# Patient Record
Sex: FEMALE | Race: WHITE | NOT HISPANIC OR LATINO | Employment: OTHER | URBAN - METROPOLITAN AREA
[De-identification: names, ages, dates, MRNs, and addresses within clinical notes are randomized per-mention and may not be internally consistent; named-entity substitution may affect disease eponyms.]

---

## 2017-01-01 ENCOUNTER — APPOINTMENT (OUTPATIENT)
Dept: GENERAL RADIOLOGY | Facility: HOSPITAL | Age: 77
End: 2017-01-01

## 2017-01-01 ENCOUNTER — HOSPITAL ENCOUNTER (INPATIENT)
Facility: HOSPITAL | Age: 77
LOS: 4 days | End: 2017-01-25
Attending: EMERGENCY MEDICINE | Admitting: INTERNAL MEDICINE

## 2017-01-01 ENCOUNTER — APPOINTMENT (OUTPATIENT)
Dept: CT IMAGING | Facility: HOSPITAL | Age: 77
End: 2017-01-01

## 2017-01-01 ENCOUNTER — OFFICE VISIT (OUTPATIENT)
Dept: FAMILY MEDICINE CLINIC | Facility: CLINIC | Age: 77
End: 2017-01-01

## 2017-01-01 VITALS
TEMPERATURE: 98.2 F | BODY MASS INDEX: 17.54 KG/M2 | SYSTOLIC BLOOD PRESSURE: 122 MMHG | OXYGEN SATURATION: 96 % | WEIGHT: 118.4 LBS | DIASTOLIC BLOOD PRESSURE: 80 MMHG | HEART RATE: 72 BPM | HEIGHT: 69 IN

## 2017-01-01 VITALS
TEMPERATURE: 100.5 F | RESPIRATION RATE: 16 BRPM | BODY MASS INDEX: 22.2 KG/M2 | OXYGEN SATURATION: 71 % | SYSTOLIC BLOOD PRESSURE: 112 MMHG | WEIGHT: 130 LBS | DIASTOLIC BLOOD PRESSURE: 59 MMHG | HEART RATE: 91 BPM | HEIGHT: 64 IN

## 2017-01-01 DIAGNOSIS — A41.9 SEPSIS, DUE TO UNSPECIFIED ORGANISM: ICD-10-CM

## 2017-01-01 DIAGNOSIS — J18.9 MULTIFOCAL PNEUMONIA: ICD-10-CM

## 2017-01-01 DIAGNOSIS — J96.01 ACUTE RESPIRATORY FAILURE WITH HYPOXIA AND HYPERCAPNIA (HCC): Primary | ICD-10-CM

## 2017-01-01 DIAGNOSIS — J96.02 ACUTE RESPIRATORY FAILURE WITH HYPOXIA AND HYPERCAPNIA (HCC): Primary | ICD-10-CM

## 2017-01-01 DIAGNOSIS — J43.8 OTHER EMPHYSEMA (HCC): ICD-10-CM

## 2017-01-01 DIAGNOSIS — I48.91 ATRIAL FIBRILLATION WITH RAPID VENTRICULAR RESPONSE (HCC): ICD-10-CM

## 2017-01-01 DIAGNOSIS — J40 BRONCHITIS: Primary | ICD-10-CM

## 2017-01-01 LAB
ALBUMIN SERPL-MCNC: 4.4 G/DL (ref 3.5–5.2)
ALBUMIN/GLOB SERPL: 1.7 G/DL
ALP SERPL-CCNC: 76 U/L (ref 39–117)
ALT SERPL W P-5'-P-CCNC: 16 U/L (ref 1–33)
ANION GAP SERPL CALCULATED.3IONS-SCNC: 15.8 MMOL/L
ARTERIAL PATENCY WRIST A: ABNORMAL
ARTERIAL PATENCY WRIST A: ABNORMAL
ARTERIAL PATENCY WRIST A: POSITIVE
AST SERPL-CCNC: 19 U/L (ref 1–32)
ATMOSPHERIC PRESS: 736.7 MMHG
ATMOSPHERIC PRESS: 746.1 MMHG
ATMOSPHERIC PRESS: 746.7 MMHG
BASE EXCESS BLDA CALC-SCNC: -1.9 MMOL/L (ref 0–2)
BASE EXCESS BLDA CALC-SCNC: -4.1 MMOL/L (ref 0–2)
BASE EXCESS BLDA CALC-SCNC: 2.9 MMOL/L (ref 0–2)
BASOPHILS # BLD AUTO: 0.05 10*3/MM3 (ref 0–0.2)
BASOPHILS NFR BLD AUTO: 0.3 % (ref 0–1.5)
BDY SITE: ABNORMAL
BILIRUB SERPL-MCNC: 1 MG/DL (ref 0.1–1.2)
BUN BLD-MCNC: 17 MG/DL (ref 8–23)
BUN/CREAT SERPL: 19.5 (ref 7–25)
CALCIUM SPEC-SCNC: 9.4 MG/DL (ref 8.6–10.5)
CHLORIDE SERPL-SCNC: 99 MMOL/L (ref 98–107)
CO2 SERPL-SCNC: 28.2 MMOL/L (ref 22–29)
CREAT BLD-MCNC: 0.87 MG/DL (ref 0.57–1)
D-LACTATE SERPL-SCNC: 3.8 MMOL/L (ref 0.5–2)
D-LACTATE SERPL-SCNC: 3.9 MMOL/L (ref 0.5–2)
DEPRECATED RDW RBC AUTO: 62.3 FL (ref 37–54)
DIGOXIN SERPL-MCNC: 0.6 NG/ML (ref 0.6–1.2)
EOSINOPHIL # BLD AUTO: 0.1 10*3/MM3 (ref 0–0.7)
EOSINOPHIL NFR BLD AUTO: 0.6 % (ref 0.3–6.2)
ERYTHROCYTE [DISTWIDTH] IN BLOOD BY AUTOMATED COUNT: 17.7 % (ref 11.7–13)
GAS FLOW AIRWAY: 8 LPM
GFR SERPL CREATININE-BSD FRML MDRD: 63 ML/MIN/1.73
GLOBULIN UR ELPH-MCNC: 2.6 GM/DL
GLUCOSE BLD-MCNC: 206 MG/DL (ref 65–99)
HCO3 BLDA-SCNC: 28.9 MMOL/L (ref 22–28)
HCO3 BLDA-SCNC: 29.5 MMOL/L (ref 22–28)
HCO3 BLDA-SCNC: 29.9 MMOL/L (ref 22–28)
HCT VFR BLD AUTO: 40.5 % (ref 35.6–45.5)
HGB BLD-MCNC: 11.5 G/DL (ref 11.9–15.5)
HOLD SPECIMEN: NORMAL
HOLD SPECIMEN: NORMAL
HOROWITZ INDEX BLD+IHG-RTO: 40 %
HOROWITZ INDEX BLD+IHG-RTO: 50 %
IMM GRANULOCYTES # BLD: 0.16 10*3/MM3 (ref 0–0.03)
IMM GRANULOCYTES NFR BLD: 1 % (ref 0–0.5)
INR PPP: 1.78 (ref 0.9–1.1)
LYMPHOCYTES # BLD AUTO: 1.34 10*3/MM3 (ref 0.9–4.8)
LYMPHOCYTES NFR BLD AUTO: 8.5 % (ref 19.6–45.3)
MCH RBC QN AUTO: 27.4 PG (ref 26.9–32)
MCHC RBC AUTO-ENTMCNC: 28.4 G/DL (ref 32.4–36.3)
MCV RBC AUTO: 96.4 FL (ref 80.5–98.2)
MODALITY: ABNORMAL
MONOCYTES # BLD AUTO: 0.48 10*3/MM3 (ref 0.2–1.2)
MONOCYTES NFR BLD AUTO: 3 % (ref 5–12)
NEUTROPHILS # BLD AUTO: 13.67 10*3/MM3 (ref 1.9–8.1)
NEUTROPHILS NFR BLD AUTO: 86.6 % (ref 42.7–76)
NT-PROBNP SERPL-MCNC: 1568 PG/ML (ref 0–1800)
O2 A-A PPRESDIFF RESPIRATORY: 0.4 MMHG
O2 A-A PPRESDIFF RESPIRATORY: 0.5 MMHG
PCO2 BLDA: 106.5 MM HG (ref 35–45)
PCO2 BLDA: 49.8 MM HG (ref 35–45)
PCO2 BLDA: 90.6 MM HG (ref 35–45)
PEEP RESPIRATORY: 8 CM[H2O]
PH BLDA: 7.05 PH UNITS (ref 7.35–7.45)
PH BLDA: 7.13 PH UNITS (ref 7.35–7.45)
PH BLDA: 7.37 PH UNITS (ref 7.35–7.45)
PLATELET # BLD AUTO: 208 10*3/MM3 (ref 140–500)
PMV BLD AUTO: 10.7 FL (ref 6–12)
PO2 BLDA: 100.6 MM HG (ref 80–100)
PO2 BLDA: 120.9 MM HG (ref 80–100)
PO2 BLDA: 93.4 MM HG (ref 80–100)
POTASSIUM BLD-SCNC: 4.1 MMOL/L (ref 3.5–5.2)
PROCALCITONIN SERPL-MCNC: 0.27 NG/ML (ref 0.1–0.25)
PROT SERPL-MCNC: 7 G/DL (ref 6–8.5)
PROTHROMBIN TIME: 20.1 SECONDS (ref 11.7–14.2)
RBC # BLD AUTO: 4.2 10*6/MM3 (ref 3.9–5.2)
SAO2 % BLDCOA: 93.1 % (ref 92–99)
SAO2 % BLDCOA: 96.7 % (ref 92–99)
SAO2 % BLDCOA: 96.9 % (ref 92–99)
SET MECH RESP RATE: 26
SET MECH RESP RATE: 26
SODIUM BLD-SCNC: 143 MMOL/L (ref 136–145)
TOTAL RATE: 26 BREATHS/MINUTE
TOTAL RATE: 26 BREATHS/MINUTE
TOTAL RATE: 29 BREATHS/MINUTE
TROPONIN T SERPL-MCNC: 0.09 NG/ML (ref 0–0.03)
VT ON VENT VENT: 494 ML
VT ON VENT VENT: 595 ML
WBC NRBC COR # BLD: 15.8 10*3/MM3 (ref 4.5–10.7)
WHOLE BLOOD HOLD SPECIMEN: NORMAL
WHOLE BLOOD HOLD SPECIMEN: NORMAL

## 2017-01-01 PROCEDURE — 87040 BLOOD CULTURE FOR BACTERIA: CPT | Performed by: EMERGENCY MEDICINE

## 2017-01-01 PROCEDURE — 25010000002 LORAZEPAM PER 2 MG: Performed by: INTERNAL MEDICINE

## 2017-01-01 PROCEDURE — 84484 ASSAY OF TROPONIN QUANT: CPT | Performed by: EMERGENCY MEDICINE

## 2017-01-01 PROCEDURE — 80162 ASSAY OF DIGOXIN TOTAL: CPT | Performed by: EMERGENCY MEDICINE

## 2017-01-01 PROCEDURE — 94660 CPAP INITIATION&MGMT: CPT

## 2017-01-01 PROCEDURE — 83605 ASSAY OF LACTIC ACID: CPT | Performed by: EMERGENCY MEDICINE

## 2017-01-01 PROCEDURE — 25010000002 METHYLPREDNISOLONE PER 125 MG: Performed by: EMERGENCY MEDICINE

## 2017-01-01 PROCEDURE — 82803 BLOOD GASES ANY COMBINATION: CPT

## 2017-01-01 PROCEDURE — 71010 HC CHEST PA OR AP: CPT

## 2017-01-01 PROCEDURE — 94640 AIRWAY INHALATION TREATMENT: CPT

## 2017-01-01 PROCEDURE — 94799 UNLISTED PULMONARY SVC/PX: CPT

## 2017-01-01 PROCEDURE — 94760 N-INVAS EAR/PLS OXIMETRY 1: CPT

## 2017-01-01 PROCEDURE — 25010000002 PIPERACILLIN SOD-TAZOBACTAM PER 1 G: Performed by: INTERNAL MEDICINE

## 2017-01-01 PROCEDURE — 85610 PROTHROMBIN TIME: CPT | Performed by: EMERGENCY MEDICINE

## 2017-01-01 PROCEDURE — 93010 ELECTROCARDIOGRAM REPORT: CPT | Performed by: INTERNAL MEDICINE

## 2017-01-01 PROCEDURE — 71250 CT THORAX DX C-: CPT

## 2017-01-01 PROCEDURE — 85025 COMPLETE CBC W/AUTO DIFF WBC: CPT | Performed by: EMERGENCY MEDICINE

## 2017-01-01 PROCEDURE — 25010000002 HYDROMORPHONE PER 4 MG: Performed by: INTERNAL MEDICINE

## 2017-01-01 PROCEDURE — 25010000002 MORPHINE PER 10 MG: Performed by: EMERGENCY MEDICINE

## 2017-01-01 PROCEDURE — 25010000002 ONDANSETRON PER 1 MG: Performed by: EMERGENCY MEDICINE

## 2017-01-01 PROCEDURE — 25010000002 VANCOMYCIN: Performed by: INTERNAL MEDICINE

## 2017-01-01 PROCEDURE — 25010000002 METHYLPREDNISOLONE PER 40 MG: Performed by: INTERNAL MEDICINE

## 2017-01-01 PROCEDURE — 25010000002 MAGNESIUM SULFATE IN D5W 1G/100ML (PREMIX) 10-5 MG/ML-% SOLUTION: Performed by: INTERNAL MEDICINE

## 2017-01-01 PROCEDURE — 36600 WITHDRAWAL OF ARTERIAL BLOOD: CPT

## 2017-01-01 PROCEDURE — 25010000002 DIGOXIN PER 500 MCG: Performed by: INTERNAL MEDICINE

## 2017-01-01 PROCEDURE — 99284 EMERGENCY DEPT VISIT MOD MDM: CPT

## 2017-01-01 PROCEDURE — 25010000002 MORPHINE PER 10 MG: Performed by: INTERNAL MEDICINE

## 2017-01-01 PROCEDURE — 36415 COLL VENOUS BLD VENIPUNCTURE: CPT | Performed by: EMERGENCY MEDICINE

## 2017-01-01 PROCEDURE — 84145 PROCALCITONIN (PCT): CPT | Performed by: EMERGENCY MEDICINE

## 2017-01-01 PROCEDURE — 93005 ELECTROCARDIOGRAM TRACING: CPT | Performed by: EMERGENCY MEDICINE

## 2017-01-01 PROCEDURE — 83880 ASSAY OF NATRIURETIC PEPTIDE: CPT | Performed by: EMERGENCY MEDICINE

## 2017-01-01 PROCEDURE — 25010000003 CEFTRIAXONE PER 250 MG: Performed by: EMERGENCY MEDICINE

## 2017-01-01 PROCEDURE — 80053 COMPREHEN METABOLIC PANEL: CPT | Performed by: EMERGENCY MEDICINE

## 2017-01-01 PROCEDURE — 99213 OFFICE O/P EST LOW 20 MIN: CPT | Performed by: FAMILY MEDICINE

## 2017-01-01 PROCEDURE — 99221 1ST HOSP IP/OBS SF/LOW 40: CPT | Performed by: INTERNAL MEDICINE

## 2017-01-01 RX ORDER — MORPHINE SULFATE 2 MG/ML
1 INJECTION, SOLUTION INTRAMUSCULAR; INTRAVENOUS EVERY 4 HOURS PRN
Status: DISCONTINUED | OUTPATIENT
Start: 2017-01-01 | End: 2017-01-01

## 2017-01-01 RX ORDER — VANCOMYCIN HYDROCHLORIDE 1 G/200ML
1000 INJECTION, SOLUTION INTRAVENOUS EVERY 24 HOURS
Status: DISCONTINUED | OUTPATIENT
Start: 2017-01-01 | End: 2017-01-01

## 2017-01-01 RX ORDER — PROMETHAZINE HYDROCHLORIDE 25 MG/ML
6.25 INJECTION, SOLUTION INTRAMUSCULAR; INTRAVENOUS EVERY 4 HOURS PRN
Status: DISCONTINUED | OUTPATIENT
Start: 2017-01-01 | End: 2017-01-01 | Stop reason: HOSPADM

## 2017-01-01 RX ORDER — LORAZEPAM 2 MG/ML
1 CONCENTRATE ORAL
Status: DISCONTINUED | OUTPATIENT
Start: 2017-01-01 | End: 2017-01-01 | Stop reason: HOSPADM

## 2017-01-01 RX ORDER — ONDANSETRON 2 MG/ML
4 INJECTION INTRAMUSCULAR; INTRAVENOUS ONCE
Status: COMPLETED | OUTPATIENT
Start: 2017-01-01 | End: 2017-01-01

## 2017-01-01 RX ORDER — LORAZEPAM 2 MG/ML
2 INJECTION INTRAMUSCULAR
Status: DISCONTINUED | OUTPATIENT
Start: 2017-01-01 | End: 2017-01-01 | Stop reason: HOSPADM

## 2017-01-01 RX ORDER — ATROPINE SULFATE 10 MG/ML
2 SOLUTION/ DROPS OPHTHALMIC 2 TIMES DAILY PRN
Status: DISCONTINUED | OUTPATIENT
Start: 2017-01-01 | End: 2017-01-01 | Stop reason: HOSPADM

## 2017-01-01 RX ORDER — LORAZEPAM 2 MG/ML
0.5 INJECTION INTRAMUSCULAR
Status: DISCONTINUED | OUTPATIENT
Start: 2017-01-01 | End: 2017-01-01 | Stop reason: HOSPADM

## 2017-01-01 RX ORDER — HALOPERIDOL 2 MG/ML
1 SOLUTION ORAL EVERY 4 HOURS PRN
Status: DISCONTINUED | OUTPATIENT
Start: 2017-01-01 | End: 2017-01-01 | Stop reason: HOSPADM

## 2017-01-01 RX ORDER — ALBUTEROL SULFATE 2.5 MG/3ML
2.5 SOLUTION RESPIRATORY (INHALATION)
Status: COMPLETED | OUTPATIENT
Start: 2017-01-01 | End: 2017-01-01

## 2017-01-01 RX ORDER — LORAZEPAM 2 MG/ML
1 INJECTION INTRAMUSCULAR
Status: DISCONTINUED | OUTPATIENT
Start: 2017-01-01 | End: 2017-01-01 | Stop reason: HOSPADM

## 2017-01-01 RX ORDER — PRAVASTATIN SODIUM 10 MG
TABLET ORAL
Qty: 30 TABLET | Refills: 3 | Status: SHIPPED | OUTPATIENT
Start: 2017-01-01

## 2017-01-01 RX ORDER — ACETAMINOPHEN 325 MG/1
650 TABLET ORAL EVERY 4 HOURS PRN
Status: DISCONTINUED | OUTPATIENT
Start: 2017-01-01 | End: 2017-01-01 | Stop reason: HOSPADM

## 2017-01-01 RX ORDER — ACETAMINOPHEN 650 MG/1
650 SUPPOSITORY RECTAL EVERY 4 HOURS PRN
Status: DISCONTINUED | OUTPATIENT
Start: 2017-01-01 | End: 2017-01-01 | Stop reason: HOSPADM

## 2017-01-01 RX ORDER — CEFTRIAXONE SODIUM 1 G/50ML
1 INJECTION, SOLUTION INTRAVENOUS ONCE
Status: COMPLETED | OUTPATIENT
Start: 2017-01-01 | End: 2017-01-01

## 2017-01-01 RX ORDER — OXYCODONE HYDROCHLORIDE AND ACETAMINOPHEN 5; 325 MG/1; MG/1
1 TABLET ORAL EVERY 4 HOURS PRN
COMMUNITY

## 2017-01-01 RX ORDER — DIGOXIN 0.25 MG/ML
125 INJECTION INTRAMUSCULAR; INTRAVENOUS ONCE
Status: COMPLETED | OUTPATIENT
Start: 2017-01-01 | End: 2017-01-01

## 2017-01-01 RX ORDER — DIPHENOXYLATE HYDROCHLORIDE AND ATROPINE SULFATE 2.5; .025 MG/1; MG/1
1 TABLET ORAL
Status: DISCONTINUED | OUTPATIENT
Start: 2017-01-01 | End: 2017-01-01 | Stop reason: HOSPADM

## 2017-01-01 RX ORDER — LORAZEPAM 2 MG/ML
0.5 CONCENTRATE ORAL
Status: DISCONTINUED | OUTPATIENT
Start: 2017-01-01 | End: 2017-01-01 | Stop reason: HOSPADM

## 2017-01-01 RX ORDER — ACETAMINOPHEN 160 MG/5ML
650 SOLUTION ORAL EVERY 4 HOURS PRN
Status: DISCONTINUED | OUTPATIENT
Start: 2017-01-01 | End: 2017-01-01 | Stop reason: HOSPADM

## 2017-01-01 RX ORDER — GLYCOPYRROLATE 0.2 MG/ML
0.4 INJECTION INTRAMUSCULAR; INTRAVENOUS
Status: DISCONTINUED | OUTPATIENT
Start: 2017-01-01 | End: 2017-01-01 | Stop reason: HOSPADM

## 2017-01-01 RX ORDER — SODIUM CHLORIDE 0.9 % (FLUSH) 0.9 %
1-10 SYRINGE (ML) INJECTION AS NEEDED
Status: DISCONTINUED | OUTPATIENT
Start: 2017-01-01 | End: 2017-01-01

## 2017-01-01 RX ORDER — PROMETHAZINE HYDROCHLORIDE 6.25 MG/5ML
6.25 SYRUP ORAL EVERY 4 HOURS PRN
Status: DISCONTINUED | OUTPATIENT
Start: 2017-01-01 | End: 2017-01-01 | Stop reason: HOSPADM

## 2017-01-01 RX ORDER — LORAZEPAM 2 MG/ML
2 CONCENTRATE ORAL
Status: DISCONTINUED | OUTPATIENT
Start: 2017-01-01 | End: 2017-01-01 | Stop reason: HOSPADM

## 2017-01-01 RX ORDER — PROMETHAZINE HYDROCHLORIDE 12.5 MG/1
6.25 SUPPOSITORY RECTAL EVERY 4 HOURS PRN
Status: DISCONTINUED | OUTPATIENT
Start: 2017-01-01 | End: 2017-01-01 | Stop reason: HOSPADM

## 2017-01-01 RX ORDER — AZITHROMYCIN 250 MG/1
TABLET, FILM COATED ORAL
Qty: 6 TABLET | Refills: 0 | Status: SHIPPED | OUTPATIENT
Start: 2017-01-01

## 2017-01-01 RX ORDER — IPRATROPIUM BROMIDE AND ALBUTEROL SULFATE 2.5; .5 MG/3ML; MG/3ML
3 SOLUTION RESPIRATORY (INHALATION)
Status: DISCONTINUED | OUTPATIENT
Start: 2017-01-01 | End: 2017-01-01

## 2017-01-01 RX ORDER — LORAZEPAM 0.5 MG/1
0.5 TABLET ORAL
Status: DISCONTINUED | OUTPATIENT
Start: 2017-01-01 | End: 2017-01-01 | Stop reason: HOSPADM

## 2017-01-01 RX ORDER — DILTIAZEM HYDROCHLORIDE 5 MG/ML
5 INJECTION INTRAVENOUS ONCE
Status: COMPLETED | OUTPATIENT
Start: 2017-01-01 | End: 2017-01-01

## 2017-01-01 RX ORDER — SCOLOPAMINE TRANSDERMAL SYSTEM 1 MG/1
1 PATCH, EXTENDED RELEASE TRANSDERMAL
Status: DISCONTINUED | OUTPATIENT
Start: 2017-01-01 | End: 2017-01-01 | Stop reason: HOSPADM

## 2017-01-01 RX ORDER — NALOXONE HCL 0.4 MG/ML
0.4 VIAL (ML) INJECTION
Status: DISCONTINUED | OUTPATIENT
Start: 2017-01-01 | End: 2017-01-01

## 2017-01-01 RX ORDER — ACETAMINOPHEN 500 MG
1000 TABLET ORAL ONCE
Status: DISCONTINUED | OUTPATIENT
Start: 2017-01-01 | End: 2017-01-01

## 2017-01-01 RX ORDER — LORAZEPAM 1 MG/1
2 TABLET ORAL
Status: DISCONTINUED | OUTPATIENT
Start: 2017-01-01 | End: 2017-01-01 | Stop reason: HOSPADM

## 2017-01-01 RX ORDER — SODIUM CHLORIDE 0.9 % (FLUSH) 0.9 %
10 SYRINGE (ML) INJECTION AS NEEDED
Status: DISCONTINUED | OUTPATIENT
Start: 2017-01-01 | End: 2017-01-01 | Stop reason: HOSPADM

## 2017-01-01 RX ORDER — ACETAMINOPHEN 160 MG/5ML
1000 SOLUTION ORAL ONCE
Status: COMPLETED | OUTPATIENT
Start: 2017-01-01 | End: 2017-01-01

## 2017-01-01 RX ORDER — PREDNISONE 10 MG/1
TABLET ORAL
Qty: 30 TABLET | Refills: 2 | Status: SHIPPED | OUTPATIENT
Start: 2017-01-01

## 2017-01-01 RX ORDER — HALOPERIDOL 5 MG/ML
1 INJECTION INTRAMUSCULAR EVERY 4 HOURS PRN
Status: DISCONTINUED | OUTPATIENT
Start: 2017-01-01 | End: 2017-01-01 | Stop reason: HOSPADM

## 2017-01-01 RX ORDER — GLYCOPYRROLATE 0.2 MG/ML
0.2 INJECTION INTRAMUSCULAR; INTRAVENOUS
Status: DISCONTINUED | OUTPATIENT
Start: 2017-01-01 | End: 2017-01-01 | Stop reason: HOSPADM

## 2017-01-01 RX ORDER — IPRATROPIUM BROMIDE AND ALBUTEROL SULFATE 2.5; .5 MG/3ML; MG/3ML
3 SOLUTION RESPIRATORY (INHALATION) EVERY 4 HOURS PRN
Status: DISCONTINUED | OUTPATIENT
Start: 2017-01-01 | End: 2017-01-01 | Stop reason: HOSPADM

## 2017-01-01 RX ORDER — OXYCODONE AND ACETAMINOPHEN 10; 325 MG/1; MG/1
1 TABLET ORAL NIGHTLY
COMMUNITY

## 2017-01-01 RX ORDER — METHYLPREDNISOLONE SODIUM SUCCINATE 125 MG/2ML
125 INJECTION, POWDER, LYOPHILIZED, FOR SOLUTION INTRAMUSCULAR; INTRAVENOUS ONCE
Status: COMPLETED | OUTPATIENT
Start: 2017-01-01 | End: 2017-01-01

## 2017-01-01 RX ORDER — NITROGLYCERIN 0.4 MG/1
0.4 TABLET SUBLINGUAL
Status: DISCONTINUED | OUTPATIENT
Start: 2017-01-01 | End: 2017-01-01

## 2017-01-01 RX ORDER — HALOPERIDOL 1 MG/1
1 TABLET ORAL EVERY 4 HOURS PRN
Status: DISCONTINUED | OUTPATIENT
Start: 2017-01-01 | End: 2017-01-01 | Stop reason: HOSPADM

## 2017-01-01 RX ORDER — PROMETHAZINE HYDROCHLORIDE 25 MG/1
6.25 TABLET ORAL EVERY 4 HOURS PRN
Status: DISCONTINUED | OUTPATIENT
Start: 2017-01-01 | End: 2017-01-01 | Stop reason: HOSPADM

## 2017-01-01 RX ORDER — METHYLPREDNISOLONE SODIUM SUCCINATE 40 MG/ML
40 INJECTION, POWDER, LYOPHILIZED, FOR SOLUTION INTRAMUSCULAR; INTRAVENOUS EVERY 12 HOURS
Status: DISCONTINUED | OUTPATIENT
Start: 2017-01-01 | End: 2017-01-01

## 2017-01-01 RX ORDER — LORAZEPAM 1 MG/1
1 TABLET ORAL
Status: DISCONTINUED | OUTPATIENT
Start: 2017-01-01 | End: 2017-01-01 | Stop reason: HOSPADM

## 2017-01-01 RX ORDER — SODIUM CHLORIDE, SODIUM LACTATE, POTASSIUM CHLORIDE, CALCIUM CHLORIDE 600; 310; 30; 20 MG/100ML; MG/100ML; MG/100ML; MG/100ML
75 INJECTION, SOLUTION INTRAVENOUS CONTINUOUS
Status: DISCONTINUED | OUTPATIENT
Start: 2017-01-01 | End: 2017-01-01

## 2017-01-01 RX ADMIN — HYDROMORPHONE HYDROCHLORIDE 1 MG: 1 INJECTION, SOLUTION INTRAMUSCULAR; INTRAVENOUS; SUBCUTANEOUS at 08:37

## 2017-01-01 RX ADMIN — METHYLPREDNISOLONE SODIUM SUCCINATE 40 MG: 40 INJECTION, POWDER, FOR SOLUTION INTRAMUSCULAR; INTRAVENOUS at 20:09

## 2017-01-01 RX ADMIN — MORPHINE SULFATE 1 MG: 2 INJECTION, SOLUTION INTRAMUSCULAR; INTRAVENOUS at 23:54

## 2017-01-01 RX ADMIN — DILTIAZEM HYDROCHLORIDE 7.5 MG/HR: 100 INJECTION, POWDER, LYOPHILIZED, FOR SOLUTION INTRAVENOUS at 08:00

## 2017-01-01 RX ADMIN — GLYCOPYRROLATE 0.4 MG: 0.2 INJECTION, SOLUTION INTRAMUSCULAR; INTRAVENOUS at 16:32

## 2017-01-01 RX ADMIN — MAGNESIUM SULFATE HEPTAHYDRATE 1 G: 1 INJECTION, SOLUTION INTRAVENOUS at 23:50

## 2017-01-01 RX ADMIN — HYDROMORPHONE HYDROCHLORIDE 1 MG: 1 INJECTION, SOLUTION INTRAMUSCULAR; INTRAVENOUS; SUBCUTANEOUS at 12:40

## 2017-01-01 RX ADMIN — IPRATROPIUM BROMIDE AND ALBUTEROL SULFATE 3 ML: .5; 3 SOLUTION RESPIRATORY (INHALATION) at 07:39

## 2017-01-01 RX ADMIN — HYDROMORPHONE HYDROCHLORIDE 0.5 MG: 1 INJECTION, SOLUTION INTRAMUSCULAR; INTRAVENOUS; SUBCUTANEOUS at 06:07

## 2017-01-01 RX ADMIN — LORAZEPAM 0.5 MG: 0.5 TABLET ORAL at 14:39

## 2017-01-01 RX ADMIN — LORAZEPAM 1 MG: 2 INJECTION INTRAMUSCULAR; INTRAVENOUS at 04:31

## 2017-01-01 RX ADMIN — VANCOMYCIN HYDROCHLORIDE 1250 MG: 1 INJECTION, POWDER, LYOPHILIZED, FOR SOLUTION INTRAVENOUS at 21:39

## 2017-01-01 RX ADMIN — GLYCOPYRROLATE 0.4 MG: 0.2 INJECTION, SOLUTION INTRAMUSCULAR; INTRAVENOUS at 08:37

## 2017-01-01 RX ADMIN — METHYLPREDNISOLONE SODIUM SUCCINATE 40 MG: 40 INJECTION, POWDER, FOR SOLUTION INTRAMUSCULAR; INTRAVENOUS at 09:25

## 2017-01-01 RX ADMIN — HYDROMORPHONE HYDROCHLORIDE 1 MG: 1 INJECTION, SOLUTION INTRAMUSCULAR; INTRAVENOUS; SUBCUTANEOUS at 00:36

## 2017-01-01 RX ADMIN — LORAZEPAM 1 MG: 2 INJECTION INTRAMUSCULAR; INTRAVENOUS at 21:11

## 2017-01-01 RX ADMIN — HYDROMORPHONE HYDROCHLORIDE 0.5 MG: 1 INJECTION, SOLUTION INTRAMUSCULAR; INTRAVENOUS; SUBCUTANEOUS at 08:08

## 2017-01-01 RX ADMIN — HYDROMORPHONE HYDROCHLORIDE 1 MG: 1 INJECTION, SOLUTION INTRAMUSCULAR; INTRAVENOUS; SUBCUTANEOUS at 12:33

## 2017-01-01 RX ADMIN — LORAZEPAM 1 MG: 2 INJECTION INTRAMUSCULAR; INTRAVENOUS at 00:43

## 2017-01-01 RX ADMIN — LORAZEPAM 1 MG: 2 INJECTION INTRAMUSCULAR; INTRAVENOUS at 16:32

## 2017-01-01 RX ADMIN — SCOPALAMINE 1 PATCH: 1 PATCH, EXTENDED RELEASE TRANSDERMAL at 15:03

## 2017-01-01 RX ADMIN — LORAZEPAM 1 MG: 2 INJECTION INTRAMUSCULAR; INTRAVENOUS at 12:33

## 2017-01-01 RX ADMIN — LORAZEPAM 1 MG: 2 INJECTION INTRAMUSCULAR; INTRAVENOUS at 09:02

## 2017-01-01 RX ADMIN — GLYCOPYRROLATE 0.4 MG: 0.2 INJECTION, SOLUTION INTRAMUSCULAR; INTRAVENOUS at 12:40

## 2017-01-01 RX ADMIN — AZITHROMYCIN DIHYDRATE 500 MG: 500 INJECTION, POWDER, LYOPHILIZED, FOR SOLUTION INTRAVENOUS at 16:33

## 2017-01-01 RX ADMIN — HYDROMORPHONE HYDROCHLORIDE 1 MG: 1 INJECTION, SOLUTION INTRAMUSCULAR; INTRAVENOUS; SUBCUTANEOUS at 21:09

## 2017-01-01 RX ADMIN — HYDROMORPHONE HYDROCHLORIDE 0.5 MG: 1 INJECTION, SOLUTION INTRAMUSCULAR; INTRAVENOUS; SUBCUTANEOUS at 22:57

## 2017-01-01 RX ADMIN — LORAZEPAM 2 MG: 2 SOLUTION, CONCENTRATE ORAL at 19:37

## 2017-01-01 RX ADMIN — HYDROMORPHONE HYDROCHLORIDE 1 MG: 1 INJECTION, SOLUTION INTRAMUSCULAR; INTRAVENOUS; SUBCUTANEOUS at 16:32

## 2017-01-01 RX ADMIN — MORPHINE SULFATE 1 MG: 2 INJECTION, SOLUTION INTRAMUSCULAR; INTRAVENOUS at 09:35

## 2017-01-01 RX ADMIN — HYDROMORPHONE HYDROCHLORIDE 0.5 MG: 1 INJECTION, SOLUTION INTRAMUSCULAR; INTRAVENOUS; SUBCUTANEOUS at 03:53

## 2017-01-01 RX ADMIN — GLYCOPYRROLATE 0.4 MG: 0.2 INJECTION, SOLUTION INTRAMUSCULAR; INTRAVENOUS at 04:46

## 2017-01-01 RX ADMIN — HYDROMORPHONE HYDROCHLORIDE 1 MG: 1 INJECTION, SOLUTION INTRAMUSCULAR; INTRAVENOUS; SUBCUTANEOUS at 04:46

## 2017-01-01 RX ADMIN — HYDROMORPHONE HYDROCHLORIDE 1 MG: 1 INJECTION, SOLUTION INTRAMUSCULAR; INTRAVENOUS; SUBCUTANEOUS at 21:11

## 2017-01-01 RX ADMIN — LORAZEPAM 1 MG: 2 INJECTION INTRAMUSCULAR; INTRAVENOUS at 21:09

## 2017-01-01 RX ADMIN — SODIUM CHLORIDE, POTASSIUM CHLORIDE, SODIUM LACTATE AND CALCIUM CHLORIDE 75 ML/HR: 600; 310; 30; 20 INJECTION, SOLUTION INTRAVENOUS at 21:04

## 2017-01-01 RX ADMIN — HYDROMORPHONE HYDROCHLORIDE 1 MG: 1 INJECTION, SOLUTION INTRAMUSCULAR; INTRAVENOUS; SUBCUTANEOUS at 03:48

## 2017-01-01 RX ADMIN — HYDROMORPHONE HYDROCHLORIDE 0.5 MG: 1 INJECTION, SOLUTION INTRAMUSCULAR; INTRAVENOUS; SUBCUTANEOUS at 10:14

## 2017-01-01 RX ADMIN — DIGOXIN 125 MCG: 0.25 INJECTION INTRAMUSCULAR; INTRAVENOUS at 20:09

## 2017-01-01 RX ADMIN — MORPHINE SULFATE 4 MG: 4 INJECTION, SOLUTION INTRAMUSCULAR; INTRAVENOUS at 13:36

## 2017-01-01 RX ADMIN — GLYCOPYRROLATE 0.4 MG: 0.2 INJECTION, SOLUTION INTRAMUSCULAR; INTRAVENOUS at 05:50

## 2017-01-01 RX ADMIN — LORAZEPAM 1 MG: 2 INJECTION INTRAMUSCULAR; INTRAVENOUS at 14:33

## 2017-01-01 RX ADMIN — IPRATROPIUM BROMIDE AND ALBUTEROL SULFATE 3 ML: .5; 3 SOLUTION RESPIRATORY (INHALATION) at 19:43

## 2017-01-01 RX ADMIN — LORAZEPAM 0.5 MG: 2 INJECTION INTRAMUSCULAR; INTRAVENOUS at 03:49

## 2017-01-01 RX ADMIN — HYDROMORPHONE HYDROCHLORIDE 1 MG: 1 INJECTION, SOLUTION INTRAMUSCULAR; INTRAVENOUS; SUBCUTANEOUS at 15:06

## 2017-01-01 RX ADMIN — METHYLPREDNISOLONE SODIUM SUCCINATE 125 MG: 125 INJECTION, POWDER, FOR SOLUTION INTRAMUSCULAR; INTRAVENOUS at 13:35

## 2017-01-01 RX ADMIN — ACETAMINOPHEN 1000 MG: 160 SOLUTION ORAL at 13:52

## 2017-01-01 RX ADMIN — GLYCOPYRROLATE 0.4 MG: 0.2 INJECTION, SOLUTION INTRAMUSCULAR; INTRAVENOUS at 15:00

## 2017-01-01 RX ADMIN — DILTIAZEM HYDROCHLORIDE 5 MG/HR: 100 INJECTION, POWDER, LYOPHILIZED, FOR SOLUTION INTRAVENOUS at 15:28

## 2017-01-01 RX ADMIN — GLYCOPYRROLATE 0.4 MG: 0.2 INJECTION, SOLUTION INTRAMUSCULAR; INTRAVENOUS at 00:36

## 2017-01-01 RX ADMIN — HYDROMORPHONE HYDROCHLORIDE 0.5 MG: 1 INJECTION, SOLUTION INTRAMUSCULAR; INTRAVENOUS; SUBCUTANEOUS at 20:08

## 2017-01-01 RX ADMIN — LORAZEPAM 0.5 MG: 2 INJECTION INTRAMUSCULAR; INTRAVENOUS at 13:56

## 2017-01-01 RX ADMIN — HYDROMORPHONE HYDROCHLORIDE 1 MG: 1 INJECTION, SOLUTION INTRAMUSCULAR; INTRAVENOUS; SUBCUTANEOUS at 19:10

## 2017-01-01 RX ADMIN — HYDROMORPHONE HYDROCHLORIDE 0.5 MG: 1 INJECTION, SOLUTION INTRAMUSCULAR; INTRAVENOUS; SUBCUTANEOUS at 11:56

## 2017-01-01 RX ADMIN — LORAZEPAM 1 MG: 2 INJECTION INTRAMUSCULAR; INTRAVENOUS at 00:36

## 2017-01-01 RX ADMIN — HYDROMORPHONE HYDROCHLORIDE 1 MG: 1 INJECTION, SOLUTION INTRAMUSCULAR; INTRAVENOUS; SUBCUTANEOUS at 09:03

## 2017-01-01 RX ADMIN — HYDROMORPHONE HYDROCHLORIDE 1 MG: 1 INJECTION, SOLUTION INTRAMUSCULAR; INTRAVENOUS; SUBCUTANEOUS at 13:37

## 2017-01-01 RX ADMIN — GLYCOPYRROLATE 0.4 MG: 0.2 INJECTION, SOLUTION INTRAMUSCULAR; INTRAVENOUS at 12:33

## 2017-01-01 RX ADMIN — LORAZEPAM 1 MG: 2 INJECTION INTRAMUSCULAR; INTRAVENOUS at 04:46

## 2017-01-01 RX ADMIN — ALBUTEROL SULFATE 2.5 MG: 2.5 SOLUTION RESPIRATORY (INHALATION) at 15:20

## 2017-01-01 RX ADMIN — TAZOBACTAM SODIUM AND PIPERACILLIN SODIUM 3.38 G: 375; 3 INJECTION, SOLUTION INTRAVENOUS at 04:43

## 2017-01-01 RX ADMIN — TAZOBACTAM SODIUM AND PIPERACILLIN SODIUM 3.38 G: 375; 3 INJECTION, SOLUTION INTRAVENOUS at 11:56

## 2017-01-01 RX ADMIN — HYDROMORPHONE HYDROCHLORIDE 2 MG: 1 INJECTION, SOLUTION INTRAMUSCULAR; INTRAVENOUS; SUBCUTANEOUS at 15:00

## 2017-01-01 RX ADMIN — ONDANSETRON 4 MG: 2 INJECTION INTRAMUSCULAR; INTRAVENOUS at 13:35

## 2017-01-01 RX ADMIN — TAZOBACTAM SODIUM AND PIPERACILLIN SODIUM 3.38 G: 375; 3 INJECTION, SOLUTION INTRAVENOUS at 20:09

## 2017-01-01 RX ADMIN — ALBUTEROL SULFATE 2.5 MG: 2.5 SOLUTION RESPIRATORY (INHALATION) at 15:15

## 2017-01-01 RX ADMIN — GLYCOPYRROLATE 0.4 MG: 0.2 INJECTION, SOLUTION INTRAMUSCULAR; INTRAVENOUS at 00:42

## 2017-01-01 RX ADMIN — HYDROMORPHONE HYDROCHLORIDE 1 MG: 1 INJECTION, SOLUTION INTRAMUSCULAR; INTRAVENOUS; SUBCUTANEOUS at 00:43

## 2017-01-01 RX ADMIN — DILTIAZEM HYDROCHLORIDE 5 MG: 5 INJECTION INTRAVENOUS at 20:08

## 2017-01-01 RX ADMIN — MORPHINE SULFATE 1 MG: 2 INJECTION, SOLUTION INTRAMUSCULAR; INTRAVENOUS at 04:45

## 2017-01-01 RX ADMIN — GLYCOPYRROLATE 0.4 MG: 0.2 INJECTION, SOLUTION INTRAMUSCULAR; INTRAVENOUS at 09:02

## 2017-01-01 RX ADMIN — MAGNESIUM SULFATE HEPTAHYDRATE 1 G: 1 INJECTION, SOLUTION INTRAVENOUS at 22:54

## 2017-01-01 RX ADMIN — CEFTRIAXONE SODIUM 1 G: 1 INJECTION, SOLUTION INTRAVENOUS at 16:06

## 2017-01-09 NOTE — PROGRESS NOTES
Subjective   Lorena Yousif is a 76 y.o. female. CC:URI    History of Present Illness   Lorena is a 76 year old female who comes in today for URI symptoms which started 2 days ago.  She quickly goes into pneumonia so needed to be checked soon.  She complains of head and chest congestion.  She has a lot of clear nasal drainage.  Her cough is bad and is productive of greenish sputum.  No known fever, denies chills and sweats.  No more short of breath than usual.  No wheezing.  On continuous oxygen.      The following portions of the patient's history were reviewed and updated as appropriate: allergies, current medications, past medical history, past social history, past surgical history and problem list.    Review of Systems   Constitutional: Positive for fatigue. Negative for chills, diaphoresis and fever.   HENT: Positive for congestion and rhinorrhea. Negative for hearing loss and sore throat.    Eyes: Negative.  Negative for visual disturbance.   Respiratory: Positive for cough and shortness of breath. Negative for wheezing.    Cardiovascular: Negative.  Negative for chest pain, palpitations and leg swelling.   Gastrointestinal: Negative.  Negative for abdominal pain, blood in stool, constipation, diarrhea, nausea and vomiting.   Genitourinary: Negative.  Negative for difficulty urinating and hematuria.   Musculoskeletal: Positive for back pain.   Skin: Negative for rash.        Bruises on arms and legs.  Scabbed abrasions on the left knee   Neurological: Negative.  Negative for dizziness, light-headedness and headaches.   Psychiatric/Behavioral: Negative.  Negative for dysphoric mood and sleep disturbance. The patient is not nervous/anxious.        Objective   Physical Exam   Constitutional: She appears well-developed and well-nourished.   HENT:   Head: Normocephalic and atraumatic.   Right Ear: Hearing, tympanic membrane, external ear and ear canal normal.   Left Ear: Hearing, tympanic membrane, external ear and  ear canal normal.   Nose: Nose normal.   Mouth/Throat: Uvula is midline, oropharynx is clear and moist and mucous membranes are normal. No oropharyngeal exudate.   Cardiovascular: Normal rate, regular rhythm and normal heart sounds.    No murmur heard.  Pulmonary/Chest: Effort normal and breath sounds normal. No respiratory distress. She has no wheezes. She has no rales.   Skin: Skin is warm and dry.   Bruises on her arms and legs.  Also on the left forehead.  Scabs on the left knee   Psychiatric: She has a normal mood and affect. Her behavior is normal.   Nursing note and vitals reviewed.    Vitals:    01/09/17 1316   BP: 122/80   Pulse: 72   Temp: 98.2 °F (36.8 °C)   SpO2: 96%     Assessment/Plan   Problems Addressed this Visit        Respiratory    Chronic obstructive pulmonary disease      Other Visit Diagnoses     Bronchitis    -  Primary    Relevant Medications    azithromycin (ZITHROMAX Z-MAIRA) 250 MG tablet

## 2017-01-09 NOTE — MR AVS SNAPSHOT
Lorena Yousif   1/9/2017 1:00 PM   Office Visit    Dept Phone:  [phone]   Encounter #:  [id number]    Provider:  Lorena Mae MD   Department:  [facility] PRIMARY CARE                Your Full Care Plan              Today's Medication Changes          These changes are accurate as of: 1/9/17  1:50 PM.  If you have any questions, ask your nurse or doctor.               New Medication(s)Ordered:     azithromycin 250 MG tablet   Commonly known as:  ZITHROMAX Z-MAIRA   Take 2 tablets the first day, then 1 tablet daily for 4 days.   Started by:  Lorena Mae MD            Where to Get Your Medications      These medications were sent to [address] [place] [address] AT [facility] - [phone]  - [phone]   [address] [facility] [address]     Phone:  [phone]     azithromycin 250 MG tablet                  Your Updated Medication List          This list is accurate as of: 1/9/17  1:50 PM.  Always use your most recent med list.                albuterol 108 (90 BASE) MCG/ACT inhaler   Commonly known as:  PROVENTIL HFA;VENTOLIN HFA       atenolol 25 MG tablet   Commonly known as:  TENORMIN       azithromycin 250 MG tablet   Commonly known as:  ZITHROMAX Z-MAIRA   Take 2 tablets the first day, then 1 tablet daily for 4 days.       BREO ELLIPTA 100-25 MCG/INH aerosol powder    Generic drug:  Fluticasone Furoate-Vilanterol       citalopram 20 MG tablet   Commonly known as:  CeleXA   TAKE ONE TABLET BY MOUTH DAILY       digoxin 125 MCG tablet   Commonly known as:  LANOXIN       ipratropium-albuterol 0.5-2.5 mg/mL nebulizer   Commonly known as:  DUO-NEB   Take 3 mL by nebulization every 4 (four) hours as needed for wheezing or shortness of air.       Iron tablet       lidocaine 5 %   Commonly known as:  LIDODERM   Place 1 patch on the skin daily. Remove & Discard patch within 12 hours or as directed by MD       nitroglycerin 0.4 MG SL tablet   Commonly known as:  NITROSTAT   Place 1 tablet under the tongue every 5 (five) minutes as needed for chest pain. Take no more than 3 doses in 15 minutes.       oxyCODONE-acetaminophen 5-325 MG/5ML solution   Commonly known as:  ROXICET,PERCOCET       pravastatin 10 MG tablet   Commonly known as:  PRAVACHOL   TAKE ONE TABLET BY MOUTH DAILY       predniSONE 10 MG tablet   Commonly known as:  DELTASONE   TAKE ONE TABLET BY MOUTH DAILY       Umeclidinium Bromide 62.5 MCG/INH aerosol powder        * warfarin 4 MG tablet   Commonly known as:  COUMADIN       * warfarin 2 MG tablet   Commonly known as:  COUMADIN       * Notice:  This list has 2 medication(s) that are the same as other medications prescribed for you. Read the directions carefully, and ask your doctor or other care provider to review them with you.            You Were Diagnosed With        Codes Comments    Bronchitis    -  Primary ICD-10-CM: J40  ICD-9-CM: 490     Other emphysema     ICD-10-CM: J43.8  ICD-9-CM: 492.8       Instructions     None    Patient Instructions History      Upcoming Appointments     Visit Type Date Time Department    OFFICE VISIT 2017  1:00 PM [employer] Signup     [facility] allows you to send messages to your doctor, view your test results, renew your prescriptions, schedule appointments, and more. To sign up, go to [employer] and click on the Sign Up Now link in the New User? box. Enter your [employer] Activation Code exactly as it appears below along with the last four digits of your Social Security Number and your Date of Birth () to complete the sign-up process. If you do not sign up before the expiration date, you must request a new code.    [employer] Activation Code: [email or web address]  Expires: 2017  1:50 PM    If you have questions, you can email [email or web address] or call [phone] to talk to our [employer] staff. Remember,  "MyChart is NOT to be used for urgent needs. For medical emergencies, dial 911.               Other Info from Your Visit           Allergies     No Known Allergies      Reason for Visit     Cough     URI green mucus      Vital Signs     Blood Pressure Pulse Temperature Height Weight Last Menstrual Period    122/80 72 98.2 °F (36.8 °C) 69\" (175.3 cm) 118 lb 6.4 oz (53.7 kg) (LMP Unknown)    Oxygen Saturation Body Mass Index Smoking Status             96% 17.48 kg/m2 Former Smoker         Problems and Diagnoses Noted     Chronic airway obstruction    Degenerative spondylolisthesis    Bronchitis    -  Primary        "

## 2017-01-21 PROBLEM — J96.01 ACUTE RESPIRATORY FAILURE WITH HYPOXIA AND HYPERCAPNIA (HCC): Status: ACTIVE | Noted: 2017-01-01

## 2017-01-21 PROBLEM — J96.02 ACUTE RESPIRATORY FAILURE WITH HYPOXIA AND HYPERCAPNIA (HCC): Status: ACTIVE | Noted: 2017-01-01

## 2017-01-21 NOTE — ED NOTES
Pt hr increased from 116 to 165. md notified. Rt at beside trying to obtain abg. md to place order for cardizem. During this time, pt pulled out IV. Per RT, pt had episode of decreased responsiveness and pt became pale. Pt was then started on cardizem and placed on bipap. Pt color improved and pt more responsive.     Leah Green RN  01/21/17 4508  a     Leah Green RN  01/21/17 0538

## 2017-01-21 NOTE — ED PROVIDER NOTES
EMERGENCY DEPARTMENT ENCOUNTER    CHIEF COMPLAINT  Chief Complaint: shortness of breath  History given by: patient, familty  History limited by: none  Room Number: 25/25  PMD: Lorena Mae MD  Cardiologist: Dr. Mullen  Pulmonologist: Dr. Monteiro    HPI:  Pt is a 76 y.o. female who presents complaining of shortness of breath onset this morning. Family states that this relatively quick onset of SOB is typical of pt's previous pneumonias. Pt also complains of a headache. Pertinent medical hx includes COPD, atrial fibrillation, pneumonia, pacemaker placement.    Duration:  Several hours  Onset: gradual, this morning  Timing: constant  Intensity/Severity: moderate  Progression: unchanged  Associated Symptoms: headache  Aggravating Factors: none stated  Alleviating Factors: none stated  Previous Episodes: hx of COPD, pneumonia  Treatment before arrival: none stated    PAST MEDICAL HISTORY  Active Ambulatory Problems     Diagnosis Date Noted   • Arthritis 02/10/2016   • Chronic obstructive pulmonary disease 02/10/2016   • Cough 02/10/2016   • Cystocele 02/10/2016   • Depression 02/10/2016   • Edema of extremities 02/10/2016   • Fatigue 02/10/2016   • Hyperlipidemia 02/10/2016   • Hypoxia 02/10/2016   • Cerebrovascular accident 02/10/2016   • Varicose veins of lower extremities 02/10/2016   • Weakness 02/10/2016   • Non-traumatic compression fracture of T7 thoracic vertebra 05/25/2016   • Atrial fibrillation with RVR 06/09/2016   • Age-related osteoporosis with current pathological fracture of vertebra 06/15/2016   • H/O mitral valve replacement with mechanical valve, on chronic anticoagulation 06/25/2016   • COPD (chronic obstructive pulmonary disease) 06/25/2016   • Hyperlipidemia 06/25/2016   • Atrial fibrillation 06/25/2016   • Pacemaker 06/25/2016   • Arthritis 06/25/2016   • CAD (coronary artery disease) 06/25/2016   • Vertebral compression fracture, S/P kyphoplasty T6,T7 06/25/2016   • Chronic respiratory  failure with hypoxia 06/26/2016   • Acute respiratory failure with hypoxia 09/06/2016   • Bruising 11/04/2016   • Normocytic anemia 11/23/2016   • Degenerative spondylolisthesis 04/04/2013     Resolved Ambulatory Problems     Diagnosis Date Noted   • Cerumen impaction 02/10/2016   • Chest pain 02/10/2016   • Dehydration 02/10/2016   • Skin lesion of face 02/10/2016   • Pain of hand 02/10/2016   • Mass of breast 02/10/2016   • Pulmonary emphysema 02/10/2016   • Shortness of breath 02/10/2016   • Shoulder pain 02/10/2016   • Upper respiratory tract infection 02/10/2016   • Urinary tract infection 02/10/2016     Past Medical History   Diagnosis Date   • Acute bronchitis    • Anemia, unspecified 11/23/2016   • Coronary artery disease    • Deep vein thrombosis    • Fracture cervical vertebra-closed    • History of transfusion    • Impacted cerumen    • Lumbar pain    • On home oxygen therapy    • Osteopenia    • Osteoporosis    • Pneumonia    • Stroke    • UTI (urinary tract infection)        PAST SURGICAL HISTORY  Past Surgical History   Procedure Laterality Date   • Coronary artery bypass graft     • Hysterectomy     • Pacemaker implantation  1999   • Cardiac valve replacement     • Oophorectomy     • Kyphoplasty N/A 6/24/2016     Procedure: KYPHOPLASTY, T6, T7;  Surgeon: Parish Obando MD;  Location: [facility] 18/19;  Service:        FAMILY HISTORY  Family History   Problem Relation Age of Onset   • Cancer Mother    • Heart disease Sister        SOCIAL HISTORY  Social History     Social History   • Marital status:      Spouse name: N/A   • Number of children: 3   • Years of education: HIGH SCHOOL     Occupational History   • RETIRED      Social History Main Topics   • Smoking status: Former Smoker     Years: 50.00     Types: Cigarettes   • Smokeless tobacco: Never Used      Comment: QUIT 13 YR AGO   • Alcohol use No   • Drug use: No   • Sexual activity: Defer     Other Topics Concern   • Not on file      Social History Narrative   • No narrative on file       ALLERGIES  Review of patient's allergies indicates no known allergies.    REVIEW OF SYSTEMS  Review of Systems   Constitutional: Positive for fever.   HENT: Negative for sore throat.    Eyes: Negative.    Respiratory: Positive for shortness of breath. Negative for cough.    Cardiovascular: Negative for chest pain.   Gastrointestinal: Negative for abdominal pain, diarrhea and vomiting.   Genitourinary: Negative for dysuria.   Musculoskeletal: Negative for neck pain.   Skin: Negative for rash.   Allergic/Immunologic: Negative.    Neurological: Positive for headaches. Negative for weakness and numbness.   Hematological: Negative.    Psychiatric/Behavioral: Negative.    All other systems reviewed and are negative.      PHYSICAL EXAM  ED Triage Vitals   Temp Heart Rate Resp BP SpO2   01/21/17 1250 01/21/17 1250 01/21/17 1258 01/21/17 1309 01/21/17 1250   99.8 °F (37.7 °C) 70 35 146/92 85 %      Temp src Heart Rate Source Patient Position BP Location FiO2 (%)   01/21/17 1250 01/21/17 1250 -- -- --   Tympanic Monitor          Physical Exam   Constitutional: She is oriented to person, place, and time.   HENT:   Head: Normocephalic and atraumatic.   Mouth/Throat: Mucous membranes are dry.   Eyes: EOM are normal. Pupils are equal, round, and reactive to light.   Pale conjunctiva   Neck: Normal range of motion. Neck supple.   Cardiovascular: Normal heart sounds.  An irregularly irregular rhythm present. Tachycardia present.    Pulmonary/Chest: She is in respiratory distress. She has wheezes. She has rhonchi.   Abdominal: Soft. There is no tenderness. There is no rebound and no guarding.   Musculoskeletal: Normal range of motion. She exhibits no edema.   Neurological: She is alert and oriented to person, place, and time. She has normal sensation and normal strength.   Skin: Skin is warm and dry. No rash noted.   Psychiatric: Mood and affect normal.   Nursing note and  vitals reviewed.      LAB RESULTS  Lab Results (last 24 hours)     Procedure Component Value Units Date/Time    Protime-INR [[id number]]  (Abnormal) Collected:  01/21/17 1308    Specimen:  Blood Updated:  01/21/17 1332     Protime 20.1 (H) Seconds      INR 1.78 (H)     Digoxin Level [[id number]]  (Normal) Collected:  01/21/17 1308    Specimen:  Blood Updated:  01/21/17 1346     Digoxin 0.60 ng/mL     CBC & Differential [[id number]] Collected:  01/21/17 1308    Specimen:  Blood Updated:  01/21/17 1329    Narrative:       The following orders were created for panel order CBC & Differential.  Procedure                               Abnormality         Status                     ---------                               -----------         ------                     CBC Auto Differential[[id number]]         Abnormal            Final result                 Please view results for these tests on the individual orders.    Comprehensive Metabolic Panel [[id number]]  (Abnormal) Collected:  01/21/17 1308    Specimen:  Blood Updated:  01/21/17 1342     Glucose 206 (H) mg/dL      BUN 17 mg/dL      Creatinine 0.87 mg/dL      Sodium 143 mmol/L      Potassium 4.1 mmol/L      Chloride 99 mmol/L      CO2 28.2 mmol/L      Calcium 9.4 mg/dL      Total Protein 7.0 g/dL      Albumin 4.40 g/dL      ALT (SGPT) 16 U/L      AST (SGOT) 19 U/L      Alkaline Phosphatase 76 U/L      Total Bilirubin 1.0 mg/dL      eGFR Non African Amer 63 mL/min/1.73      Globulin 2.6 gm/dL      A/G Ratio 1.7 g/dL      BUN/Creatinine Ratio 19.5      Anion Gap 15.8 mmol/L     Narrative:       The MDRD GFR formula is only valid for adults with stable renal function between ages 18 and 70.    BNP [[id number]]  (Normal) Collected:  01/21/17 1308    Specimen:  Blood Updated:  01/21/17 1345     proBNP 1568.0 pg/mL     Narrative:       Among patients with dyspnea, NT-proBNP is highly sensitive for the detection of acute congestive heart failure. In addition NT-proBNP of <300 pg/ml  effectively rules out acute congestive heart failure with 99% negative predictive value.    Troponin [[id number]]  (Abnormal) Collected:  01/21/17 1308    Specimen:  Blood Updated:  01/21/17 1347     Troponin T 0.091 (C) ng/mL     Narrative:       Troponin T Reference Ranges:  Less than 0.03 ng/mL:    Negative for AMI  0.03 to 0.09 ng/mL:      Indeterminant for AMI  Greater than 0.09 ng/mL: Positive for AMI    CBC Auto Differential [[id number]]  (Abnormal) Collected:  01/21/17 1308    Specimen:  Blood Updated:  01/21/17 1329     WBC 15.80 (H) 10*3/mm3      RBC 4.20 10*6/mm3      Hemoglobin 11.5 (L) g/dL      Hematocrit 40.5 %      MCV 96.4 fL      MCH 27.4 pg      MCHC 28.4 (L) g/dL      RDW 17.7 (H) %      RDW-SD 62.3 (H) fl      MPV 10.7 fL      Platelets 208 10*3/mm3      Neutrophil % 86.6 (H) %      Lymphocyte % 8.5 (L) %      Monocyte % 3.0 (L) %      Eosinophil % 0.6 %      Basophil % 0.3 %      Immature Grans % 1.0 (H) %      Neutrophils, Absolute 13.67 (H) 10*3/mm3      Lymphocytes, Absolute 1.34 10*3/mm3      Monocytes, Absolute 0.48 10*3/mm3      Eosinophils, Absolute 0.10 10*3/mm3      Basophils, Absolute 0.05 10*3/mm3      Immature Grans, Absolute 0.16 (H) 10*3/mm3     Procalcitonin [[id number]]  (Abnormal) Collected:  01/21/17 1308    Specimen:  Blood Updated:  01/21/17 1453     Procalcitonin 0.27 (H) ng/mL     Narrative:       As a Marker for Sepsis (Non-Neonates):   1. <0.5 ng/mL represents a low risk of severe sepsis and/or septic shock.  1. >2 ng/mL represents a high risk of severe sepsis and/or septic shock.    As a Marker for Lower Respiratory Tract Infections that require antibiotic therapy:  PCT on Admission     Antibiotic Therapy             6-12 Hrs later  > 0.5                Strongly Recommended            >0.25 - <0.5         Recommended  0.1 - 0.25           Discouraged                   Remeasure/reassess PCT  <0.1                 Strongly Discouraged          Remeasure/reassess PCT      As 28  "day mortality risk marker: \"Change in Procalcitonin Result\" (> 80 % or <=80 %) if Day 0 (or Day 1) and Day 4 values are available. Refer to http://www.[facility]ially[facility]ct[facility]calculator.com/   Change in PCT <=80 %   A decrease of PCT levels below or equal to 80 % defines a positive change in PCT test result representing a higher risk for 28-day all-cause mortality of patients diagnosed with severe sepsis or septic shock.  Change in PCT > 80 %   A decrease of PCT levels of more than 80 % defines a negative change in PCT result representing a lower risk for 28-day all-cause mortality of patients diagnosed with severe sepsis or septic shock.                Lactic Acid, Plasma [[id number]]  (Abnormal) Collected:  01/21/17 1342    Specimen:  Blood Updated:  01/21/17 1409     Lactate 3.8 (C) mmol/L     Blood Culture [[id number]] Collected:  01/21/17 1342    Specimen:  Blood from Arm, Right Updated:  01/21/17 1348    Blood Gas, Arterial [[id number]]  (Abnormal) Collected:  01/21/17 1549    Specimen:  Arterial Blood Updated:  01/21/17 1611     Site Arterial: left brachial      Reece's Test N/A      pH, Arterial 7.050 (C) pH units       Critical:Notify Dr dr moore (21-Jan-17 16:02:11)Read back ok        pCO2, Arterial 106.5 (C) mm Hg       Critical:Notify Dr dr moore (21-Jan-17 16:02:11)Read back ok        pO2, Arterial 100.6 (H) mm Hg      HCO3, Arterial 29.5 (H) mmol/L      Base Excess, Arterial -4.1 (L) mmol/L      O2 Saturation Calculated 93.1 %      Barometric Pressure for Blood Gas 746.7 mmHg      Modality Cannula      Flow Rate 8 lpm      Rate 26 Breaths/minute     Narrative:       sat 93 Meter: [id number] : [id number] Moises Chapman    Blood Gas, Arterial [[id number]]  (Abnormal) Collected:  01/21/17 1705    Specimen:  Arterial Blood Updated:  01/21/17 1719     Site Arterial: left brachial      Reece's Test N/A      pH, Arterial 7.126 (C) pH units       Critical:Notify Dr dr moore (21-Jan-17 17:10:44)Read back ok        pCO2, " Arterial 90.6 (C) mm Hg       Critical:Notify Dr dr moore (21-Jan-17 17:10:44)Read back ok        pO2, Arterial 120.9 (H) mm Hg      HCO3, Arterial 29.9 (H) mmol/L      Base Excess, Arterial -1.9 (L) mmol/L      O2 Saturation Calculated 96.7 %      A-a Gradiant 0.5 mmHg      Barometric Pressure for Blood Gas 746.1 mmHg      Modality BiPAP      FIO2 50 %      Set Tidal Volume 595      Set Mech Resp Rate 26      Rate 26 Breaths/minute      PEEP 8     Narrative:       sat 97 22/8 Meter: [id number] : [id number] Moises Chapman          I ordered the above labs and reviewed the results    RADIOLOGY  CT Chest Without Contrast   Final Result           1. Multifocal pulmonary infiltrates, follow-up advised.   2. Thoracic compression fractures that are new from 06/02/2016,   correlate clinically.   3. Redemonstration of markedly patulous esophagus, which is distended   with contents.       This report was finalized on 1/21/2017 3:11 PM by Dr. Javed De Jesus MD.          XR Chest 1 View   Final Result   No focal pulmonary consolidation. Cardiomegaly and pulmonary   vascular congestion.         This report was finalized on 1/21/2017 2:04 PM by Dr. Javed De Jesus MD.               I ordered the above noted radiological studies. Interpreted by radiologist. Reviewed by me in PACS.       PROCEDURES  Critical Care  Performed by: FRANCISCA MTZ  Authorized by: FRANCISCA MTZ   Total critical care time: 45 minutes  Critical care time was exclusive of separately billable procedures and treating other patients.  Critical care was necessary to treat or prevent imminent or life-threatening deterioration of the following conditions: sepsis and respiratory failure.  Critical care was time spent personally by me on the following activities: development of treatment plan with patient or surrogate, discussions with consultants, evaluation of patient's response to treatment, examination of patient, obtaining history from  patient or surrogate, ordering and performing treatments and interventions, ordering and review of laboratory studies, ordering and review of radiographic studies, pulse oximetry and re-evaluation of patient's condition.      EKG  EKG time: 1319  Rhythm/Rate: A-fib, rate 133  No Acute Ischemia  Non-Specific ST-T changes  Unchanged compared to prior in September 2016 aside from increased rate  Interpreted Contemporaneously by me.  Independently viewed by me      PROGRESS AND CONSULTS  ED Course     1314 - Pt is 95% on nasal cannula O2. Pt confirms DNR status states she would not want to be intubated or have CPR performed. Ordered labs including lactate, blood cultures, and ABG for further analysis. CXR ordered to r/o pneumonia. Ordered Tylenol for fever, albuterol breathing tx, morphine for headache and Zofran for nausea.    1420 - Pt appears to be resting and respirations improved, lactate is 3.5. Will order CT chest for further evaluation.    1524 - Notified by nursing staff that pt's a-fib has increased in rate, will order Cardizem for rate control.    1529 - Chest CT shows multifocal infiltrates, will order Rocephin and Zithromax for abx coverage. Will place call to Dr. Warner. Holding on sepsis bolus due to CHF.    1532 - Notified by respiratory therapy at bedside that pt has had decreased responsiveness, will not talk to staff. Rechecked pt, HR in the 170s, albuterol treatment in progress. Discussed with family at bedside, pt's critical status and plan for admission. Family understands and agrees with the plan. All questions answered. D/W Dr Lemon who states he will call Dr Steven    1549 - Rechecked pt, HR in the 150s, sats 93%. ABGs reviewed at bedside; pt in CO2 retention and acidotic, will have pt placed on Bipap. Updated pt's family.    1708 - Repeat ABGs ordered show improvement since placement on Bipap. HR has been improving, now in the 90s and off Cardizem. Dr. Steven has not returned call, will place  call to intensivist.     1716 - Call back from Dr. Steven to discuss the pt's case. He agrees to admit the pt to telemetry and orders to hold IVF unless pt becomes hypotensive.    1721 - Pt rechecked, pt appears visibly improved, she is still on Bipap, latest /82. Informed pt and family of plan ofr admission to telemetry. Pt and family understand and agree with the plan. All questions answered.    1745 - Informed by nursing staff pt's systolic BP now in the 80s after stopping Cardizem, if pt does not rebound before admittance upstairs will give IVF bolus.      MEDICAL DECISION MAKING  Results were reviewed/discussed with the patient and they were also made aware of online access. Pt also made aware that some labs, such as cultures, will not be resulted during ER visit and follow up with PMD is necessary.     MDM  Number of Diagnoses or Management Options  Acute respiratory failure with hypoxia and hypercapnia:   Atrial fibrillation with rapid ventricular response:   Multifocal pneumonia:   Sepsis, due to unspecified organism:      Amount and/or Complexity of Data Reviewed  Clinical lab tests: ordered and reviewed (  Lactate: 3.8  BNP: 1568  Troponin: 0.091  WBC: 15.8  )  Tests in the radiology section of CPT®: ordered and reviewed (  CXR: mildly congested pulmonary vasculature, no pleural effusion, pneumothorax, or focal pulmonary consolidation  CT chest: multifocal infiltrates)  Tests in the medicine section of CPT®: ordered and reviewed  Independent visualization of images, tracings, or specimens: yes    Critical Care  Total time providing critical care: 30-74 minutes    Patient Progress  Patient progress: (Critical)    CRITICAL CARE PERFORMED BY ED PHYSICIAN  45 minutes of critical care delivered.  Time is exclusive of separately billable procedures.    Time Includes: Direct patient care, reassessment of the patient, medical consult, documentation of care, data interpretation including labs and  imaging.  See progress notes for further documentation.       DIAGNOSIS  Final diagnoses:   Acute respiratory failure with hypoxia and hypercapnia   Sepsis, due to unspecified organism   Multifocal pneumonia   Atrial fibrillation with rapid ventricular response       DISPOSITION  ADMISSION: Patient admitted by Dr. Warner to telemetry.    Discussed treatment plan and reason for admission with pt/family and admitting physician.  Pt/family voiced understanding of the plan for admission for further testing/treatment as needed.     Latest Documented Vital Signs:  As of 5:47 PM  BP- 117/82 HR- 105 Temp- 97.5 °F (36.4 °C) (Tympanic) O2 sat- 99%    --  Documentation assistance provided by mikala Bailon for Dr. Nice.  Information recorded by the scribe was done at my direction and has been verified and validated by me.     Charles Bailon  01/21/17 1727       Charles Bailon  01/21/17 1747       Vazquez Nice MD  01/21/17 [id number]

## 2017-01-22 NOTE — PROGRESS NOTES
Called for a 22beat run of asymptomatic VT.  RFP reviewed, K at goal, will order 1gm mag, recheck level in am.  Patient has seen Dr. Sun in the past in consultation, and will ask for cardiology evaluation and assistance with afib rvr and evaluation of vt run that self terminated and was asymptomatic.  ABG reviewed, shows improvement on current bipap settings, will continue NIV support.  Continue tele monitory.    Aiden Cho MD  [facility]  01/21/17  10:12 PM

## 2017-01-22 NOTE — NURSING NOTE
Patient refused am labs, explained rationale for labs to both pt and dtr at bs. However, pt continued to refuse despite efforts to explain benefit/rationale for current treatment plan . Note left on summary page alerting MD of lab refusal.

## 2017-01-22 NOTE — PLAN OF CARE
Problem: Patient Care Overview (Adult)  Goal: Plan of Care Review  Outcome: Ongoing (interventions implemented as appropriate)    01/22/17 0516   Coping/Psychosocial Response Interventions   Plan Of Care Reviewed With patient;daughter;family;sibling;son   Patient Care Overview   Progress improving   Outcome Evaluation   Outcome Summary/Follow up Plan admitted with respiratory failure, afib c rvr, pna; BiPAP at 40%, pain mgmt, cardizem gtt titrating accordingly; AAOx3, good family support;  visited last pm with pt/family; pt voiced concerns about hospice/ Pallative RN to eval today; received IV Mg due to 23beat run of vtach-though asymptomatic; remains in afib with frequent PVCs; ABG improved this am since BiPAP conts. IVF and IV abx ongoing;        Goal: Adult Individualization and Mutuality  Outcome: Ongoing (interventions implemented as appropriate)  Goal: Discharge Needs Assessment  Outcome: Ongoing (interventions implemented as appropriate)    Problem: Pain, Acute (Adult)  Goal: Identify Related Risk Factors and Signs and Symptoms  Outcome: Outcome(s) achieved Date Met:  01/22/17  Goal: Acceptable Pain Control/Comfort Level  Outcome: Ongoing (interventions implemented as appropriate)    Problem: Fall Risk (Adult)  Goal: Identify Related Risk Factors and Signs and Symptoms  Outcome: Outcome(s) achieved Date Met:  01/22/17  Goal: Absence of Falls  Outcome: Ongoing (interventions implemented as appropriate)    Problem: Pressure Ulcer Risk (Mitul Scale) (Adult,Obstetrics,Pediatric)  Goal: Identify Related Risk Factors and Signs and Symptoms  Outcome: Outcome(s) achieved Date Met:  01/22/17  Goal: Skin Integrity  Outcome: Ongoing (interventions implemented as appropriate)    Problem: Respiratory Insufficiency (Adult)  Goal: Identify Related Risk Factors and Signs and Symptoms  Outcome: Outcome(s) achieved Date Met:  01/22/17  Goal: Acid/Base Balance  Outcome: Ongoing (interventions implemented as  appropriate)  Goal: Effective Ventilation  Outcome: Ongoing (interventions implemented as appropriate)    Problem: Cardiac Rhythm Management Device (Adult)  Goal: Signs and Symptoms of Listed Potential Problems Will be Absent or Manageable (Cardiac Rhythm Management Device)  Outcome: Ongoing (interventions implemented as appropriate)    Problem: Sepsis (Adult)  Goal: Signs and Symptoms of Listed Potential Problems Will be Absent or Manageable (Sepsis)  Outcome: Ongoing (interventions implemented as appropriate)

## 2017-01-22 NOTE — PLAN OF CARE
Problem: Patient Care Overview (Adult)  Goal: Plan of Care Review  Outcome: Ongoing (interventions implemented as appropriate)    01/22/17 8272   Coping/Psychosocial Response Interventions   Plan Of Care Reviewed With patient;family   Patient Care Overview   Progress declining   Outcome Evaluation   Outcome Summary/Follow up Plan patient decided to become palliative today, she was very agitated and in pain when she arrived, she has not slept in a long time. Ptn is resting after ativan and dilaudid, continue teaching on pallaitve care and monitor per protocol        Goal: Adult Individualization and Mutuality  Outcome: Ongoing (interventions implemented as appropriate)  Goal: Discharge Needs Assessment  Outcome: Ongoing (interventions implemented as appropriate)    Problem: Pain, Acute (Adult)  Goal: Acceptable Pain Control/Comfort Level  Outcome: Ongoing (interventions implemented as appropriate)    Problem: Fall Risk (Adult)  Goal: Absence of Falls  Outcome: Ongoing (interventions implemented as appropriate)    Problem: Pressure Ulcer Risk (Mitul Scale) (Adult,Obstetrics,Pediatric)  Goal: Skin Integrity  Outcome: Ongoing (interventions implemented as appropriate)    Problem: Dying Patient, Actively (Adult)  Goal: Identify Related Risk Factors and Signs and Symptoms  Outcome: Ongoing (interventions implemented as appropriate)  Goal: Comfort/Pain Control  Outcome: Ongoing (interventions implemented as appropriate)  Goal: Dying Process, Peace and Dignity  Outcome: Ongoing (interventions implemented as appropriate)

## 2017-01-22 NOTE — PLAN OF CARE
Problem: Pain, Acute (Adult)  Goal: Acceptable Pain Control/Comfort Level  Outcome: Ongoing (interventions implemented as appropriate)    01/22/17 5361   Pain, Acute (Adult)   Acceptable Pain Control/Comfort Level making progress toward outcome

## 2017-01-22 NOTE — H&P
Patient Care Team:  Lorena Mae MD as PCP - General  Lorena Mae MD as PCP - Family Medicine  Lorena Mae MD as Referring Physician (Family Medicine)  Cornelio Romero MD as Consulting Physician (Hematology and Oncology)  Alessio Mullen MD as Consulting Physician (Cardiology)  Parish Obando MD as Surgeon (Neurosurgery)  Antonio Steven MD as Consulting Physician (Pulmonary Disease)    Chief complaint shortness of breath    Subjective     Patient is a 76 y.o. female well-known to me who presented to the emergency room via her family today with severe shortness of breath oxygen saturations dropped as low as the 70s I'm told.  Patient was doing very well she had a little bit of chest congestion and URI type symptoms a couple of weeks ago had finished a Z-Kevan last Friday had been doing good all week and then just acutely over about 20 minutes today he got very short of breath.  The emergency room CT scan showed bilateral infiltrates particular the right upper lobe posteriorly in the left lower lobe basilar.  She got into more distress and they had to place her because of severe hypercapnic respiratory failure on a noninvasive ventilator.  The patient told Dr. Nice she did not want resuscitation she is told me previously and her family son and daughter in the room and her very adamant that she is not to have any life support machines other than the noninvasive ventilator and not to have CPR.  The patient does note that she had some belching just before she started getting sick.  She has a history of a very dilated patulous esophagus and hiatal hernia and has had problems with aspiration in the past.     Review of Systems  Really impossible with her on the noninvasive ventilator she talks and it leaks and she gets in distress.    History  Past Medical History   Diagnosis Date   • Acute bronchitis    • Anemia, unspecified 11/23/2016   • Arthritis    • Atrial fibrillation  "   • Bruising      UPPER EXTREMITIES   • COPD (chronic obstructive pulmonary disease)    • Coronary artery disease    • Deep vein thrombosis    • Depression    • Fracture cervical vertebra-closed    • History of transfusion    • Hyperlipidemia    • Impacted cerumen    • Lumbar pain    • On home oxygen therapy      2 LITERS   • Osteopenia    • Osteoporosis    • Pacemaker    • Pneumonia    • Stroke    • UTI (urinary tract infection)      RECENTLY WAS ON ANTIBIOTICS     Past Surgical History   Procedure Laterality Date   • Coronary artery bypass graft     • Hysterectomy     • Pacemaker implantation  1999   • Cardiac valve replacement     • Oophorectomy     • Kyphoplasty N/A 6/24/2016     Procedure: KYPHOPLASTY, T6, T7;  Surgeon: Parish Obando MD;  Location: [facility] 18/19;  Service:      Family History   Problem Relation Age of Onset   • Cancer Mother    • Heart disease Sister      Social History     Social History   • Marital status:      Spouse name: N/A   • Number of children: 3   • Years of education: HIGH SCHOOL     Occupational History   • RETIRED      Social History Main Topics   • Smoking status: Former Smoker     Years: 50.00     Types: Cigarettes   • Smokeless tobacco: Never Used      Comment: QUIT 13 YR AGO   • Alcohol use No   • Drug use: No   • Sexual activity: Defer     Other Topics Concern   • None     Social History Narrative   • None       Allergies:  Review of patient's allergies indicates no known allergies.    Objective     Vital Signs  Temp:  [97.5 °F (36.4 °C)-100.8 °F (38.2 °C)] 98.1 °F (36.7 °C)  Heart Rate:  [] 136  Resp:  [26-37] 26  BP: ()/(53-94) 118/80  Body mass index is 22.31 kg/(m^2).  No intake or output data in the 24 hours ending 01/21/17 1918       Flowsheet Rows         First Filed Value    Admission Height  64\" (162.6 cm) Documented at 01/21/2017 1306    Admission Weight  130 lb (59 kg) Documented at 01/21/2017 1306           Physical Exam:  General " Appearance: Well-developed elderly female she is a little restless on noninvasive ventilator she is on the 14/8 pressures and 40% FiO2 her saturations about 96% and she is pulling around 350 tidal volume  Eyes: Conjunctiva are clear and anicteric  ENT: Oral mucous membranes are dry  Neck: No adenopathy trachea midline  Lungs: She has a lot of crackles in the left base the right is pretty clear she is overall decreased  Cardiac: Irregularly irregular heart rates in the 1 teens mostly  Abdomen: Soft no palpable organomegaly or masses  : Not examined  Musc/Skel: Grossly normal  Skin: No jaundice no petechiae  Neuro: She is alert she seems to be oriented its hard to communicate with her because of the noninvasive ventilator  Extremities/P Vascular: No clubbing cyanosis or edema she has palpable radial and dorsalis pedis pulses  MSE: She seems to be a little scared      Labs:  WBC No results found for: WBCS   HGB HEMOGLOBIN   Date Value Ref Range Status   01/21/2017 11.5 (L) 11.9 - 15.5 g/dL Final      HCT HEMATOCRIT   Date Value Ref Range Status   01/21/2017 40.5 35.6 - 45.5 % Final      Platlets No results found for: LABPLAT     PT/INR:      PROTIME   Date Value Ref Range Status   01/21/2017 20.1 (H) 11.7 - 14.2 Seconds Final   /  INR   Date Value Ref Range Status   01/21/2017 1.78 (H) 0.90 - 1.10 Final       Sodium SODIUM   Date Value Ref Range Status   01/21/2017 143 136 - 145 mmol/L Final      Potassium POTASSIUM   Date Value Ref Range Status   01/21/2017 4.1 3.5 - 5.2 mmol/L Final      Chloride CHLORIDE   Date Value Ref Range Status   01/21/2017 99 98 - 107 mmol/L Final      Bicarbonate No results found for: PLASMABICARB   BUN BUN   Date Value Ref Range Status   01/21/2017 17 8 - 23 mg/dL Final      Creatinine CREATININE   Date Value Ref Range Status   01/21/2017 0.87 0.57 - 1.00 mg/dL Final      Calcium CALCIUM   Date Value Ref Range Status   01/21/2017 9.4 8.6 - 10.5 mg/dL Final      Magnesium No results found  for: MG     pH PH, ARTERIAL   Date Value Ref Range Status   01/21/2017 7.126 (C) 7.350 - 7.450 pH units Final     Comment:     Critical:Notify Dr dr moore (21-Jan-17 17:10:44)Read back ok   01/21/2017 7.050 (C) 7.350 - 7.450 pH units Final     Comment:     Critical:Notify Dr dr moore (21-Jan-17 16:02:11)Read back ok      pO2 PO2, ARTERIAL   Date Value Ref Range Status   01/21/2017 120.9 (H) 80.0 - 100.0 mm Hg Final   01/21/2017 100.6 (H) 80.0 - 100.0 mm Hg Final      pCO2 PCO2, ARTERIAL   Date Value Ref Range Status   01/21/2017 90.6 (C) 35.0 - 45.0 mm Hg Final     Comment:     Critical:Notify Dr dr moore (21-Jan-17 17:10:44)Read back ok   01/21/2017 106.5 (C) 35.0 - 45.0 mm Hg Final     Comment:     Critical:Notify Dr dr moore (21-Jan-17 16:02:11)Read back ok      HCO3 HCO3, ARTERIAL   Date Value Ref Range Status   01/21/2017 29.9 (H) 22.0 - 28.0 mmol/L Final   01/21/2017 29.5 (H) 22.0 - 28.0 mmol/L Final          Radiographic Imaging:  Imaging Results (last 24 hours)     Procedure Component Value Units Date/Time    XR Chest 1 View [[id number]] Collected:  01/21/17 1402     Updated:  01/21/17 1408    Narrative:       XR CHEST 1 VW-     HISTORY: Female who is 76 years-old,  short of breath     TECHNIQUE: Frontal view of the chest     COMPARISON: 09/10/2016     FINDINGS: Heart is enlarged. Sternotomy wires are present. Pulmonary  vasculature is mildly congested. Left-sided pacemaker and cardiac leads  are seen. No pleural effusion, pneumothorax, or focal pulmonary  consolidation. Otherwise stable.       Impression:       No focal pulmonary consolidation. Cardiomegaly and pulmonary  vascular congestion.       This report was finalized on 1/21/2017 2:04 PM by Dr. Javed De Jesus MD.       CT Chest Without Contrast [[id number]] Collected:  01/21/17 7717     Updated:  01/21/17 1514    Narrative:       CT CHEST WO CONTRAST-     INDICATIONS: Short of breath, pneumonia     TECHNIQUE: UN ENHANCED CHEST CT      COMPARISON: 12/26/2015. 01/21/2017 chest x-ray     FINDINGS:              The heart size is enlarged without pericardial effusion. A few  subcentimeter short axis mediastinal lymph nodes appear similar to prior  exam.     The esophagus is markedly patulous (measured at 7.5 cm on image 70 of  series 1, previously measured at 9.1 cm) and distended with debris.     The airways appear clear.     Minimal left pleural effusion is seen.           The lungs show multifocal infiltrates, predominantly in the right upper  lobe, for example image 22 of series 2 and in the left lower lobe, for  example image 39 of series 2, also mildly in the right lower lobe, image  35 and minimally in the left upper lobe, image 30, follow-up advised to  characterize resolution and to exclude possibility of underlying  lesions.     Upper abdominal structures appear unremarkable.     Degenerative changes are seen in the spine. Multiple thoracic  compression fractures are present, some with vertebral plasty material,  there is, including T8, T10, T12 are new from 06/02/2016,  age-indeterminate, correlate clinically. Old rib fractures are seen.           Impression:             1. Multifocal pulmonary infiltrates, follow-up advised.  2. Thoracic compression fractures that are new from 06/02/2016,  correlate clinically.  3. Redemonstration of markedly patulous esophagus, which is distended  with contents.     This report was finalized on 1/21/2017 3:11 PM by Dr. Javed De Jesus MD.             CT scan was reviewed    Assessment/Plan     Patient Active Problem List   Diagnosis Code   • Arthritis M19.90   • Chronic obstructive pulmonary disease J44.9   • Cough R05   • Cystocele N81.10   • Depression F32.9   • Edema of extremities R60.0   • Fatigue R53.83   • Hyperlipidemia E78.5   • Hypoxia R09.02   • Cerebrovascular accident I63.9   • Varicose veins of lower extremities I83.93   • Weakness R53.1   • Non-traumatic compression fracture of T7  thoracic vertebra M48.54XA   • Atrial fibrillation with RVR I48.91   • Age-related osteoporosis with current pathological fracture of vertebra M80.08XA   • H/O mitral valve replacement with mechanical valve, on chronic anticoagulation Z95.2   • COPD (chronic obstructive pulmonary disease) J44.9   • Hyperlipidemia E78.5   • Atrial fibrillation I48.91   • Pacemaker Z95.0   • Arthritis M19.90   • CAD (coronary artery disease) I25.10   • Vertebral compression fracture, S/P kyphoplasty T6,T7 M48.50XA   • Chronic respiratory failure with hypoxia J96.11   • Acute respiratory failure with hypoxia J96.01   • Bruising T14.8   • Normocytic anemia D64.9   • Degenerative spondylolisthesis M43.10   • Acute respiratory failure with hypoxia and hypercapnia J96.01, J96.02         Impression #1 bilateral pneumonia community-acquired recently completed an antibiotic I think this is likely aspiration given her history and findings.  Zosyn and vancomycin were started in the emergency room will continue these.  If she was not a DO NOT RESUSCITATE would have her in the intensive care unit but since we're not can use intensive care services I will treat her as if this is a ICU pneumonia.  #2 acute on chronic hypoxemic and hypercapnic respiratory failure she has chronic COPD as a cause of her chronic respiratory failure and now she hasn't acute pneumonia causing the acute component.  We will try noninvasive ventilation hopefully she'll be able to tolerated and hopefully she doesn't disttend and regurgitate.  #3 acute exacerbation of COPD secondary to #1 I am going to give her steroids IV since she is nothing by mouth and bronchodilator therapy.  #4 atrial fibrillation with rapid ventricular rate to give her another dose of digoxin check her dig level morning and we'll use some when necessary Cardizem.  He is a little subtherapeutic on her PT/INR G is probably not be able to take by mouth Coumadin for a day or 2 I will give her some  Lovenox  #5 lactic acidosis I think this is due to sepsis  #6 severe sepsis secondary to #1 she did not get fluid boluses in the emergency room because with her A. fib and her history of heart failure afraid that that she would develop some pulmonary edema and she certainly can't tolerate that since she should be nothing by mouth though I do think she needs some IV fluids I will give her fluids slowly and will watch her closely as well as very difficult situations.  Rate history of vertebral compression fractures and CT suggests some new fractures daughter reports that she had 2 falls in December refused to come to the hospital for evaluation.  #7 DNR/DNI patient does not want any resuscitative efforts other than the noninvasive ventilator and antibiotics some IV fluids etc. she does not want more aggressive therapy we will honor her wishes I discussed with the family with the patient present if she gets to where she doesn't tolerate the noninvasive ventilator and gets into trouble with they want comfort care and the answer was yes.  I suggested the family stay with her tonight and they said someone will be.  #8 distended/patulous esophagus and hiatal hernia  #9 minimally elevated troponin with known coronary artery disease I suspect this is more demand type ischemia will follow-up troponin but is really not a whole lot we can do        Antonio Steven MD  01/21/17  7:18 PM    Time:

## 2017-01-22 NOTE — PROGRESS NOTES
"Pharmacokinetic Consult - Vancomycin Dosing (Initial Note)    Lorena Yousif has been consulted for pharmacy to dose vancomycin for pneumonia.  Pharmacy dosing vancomycin per Dr Steven's request.   Goal trough: 15-20 mg/L   Other antimicrobials: Zosyn    Relevant clinical data and objective history reviewed:  76 y.o. female 64\" (162.6 cm) 130 lb (59 kg)    Past Medical History   Diagnosis Date   • Acute bronchitis    • Anemia, unspecified 11/23/2016   • Arthritis    • Atrial fibrillation    • Bruising      UPPER EXTREMITIES   • COPD (chronic obstructive pulmonary disease)    • Coronary artery disease    • Deep vein thrombosis    • Depression    • Fracture cervical vertebra-closed    • History of transfusion    • Hyperlipidemia    • Impacted cerumen    • Lumbar pain    • On home oxygen therapy      2 LITERS   • Osteopenia    • Osteoporosis    • Pacemaker    • Pneumonia    • Stroke    • UTI (urinary tract infection)      RECENTLY WAS ON ANTIBIOTICS     CREATININE   Date Value Ref Range Status   01/21/2017 0.87 0.57 - 1.00 mg/dL Final     BUN   Date Value Ref Range Status   01/21/2017 17 8 - 23 mg/dL Final     Estimated Creatinine Clearance: 46 mL/min (by C-G formula based on Cr of 0.87).    Lab Results   Component Value Date    WBC 15.80 (H) 01/21/2017     Temp Readings from Last 3 Encounters:   01/21/17 98.1 °F (36.7 °C) (Tympanic)   01/09/17 98.2 °F (36.8 °C)   11/30/16 98.2 °F (36.8 °C)           Assessment/Plan  Will start vancomycin 1,000 mg IV Q24h (LD 1,250 mg). Will monitor serum creatinine every 24 hours for the first 3 days then at least every 48 hours per dosing recommendations. Vancomycin level to be ordered by clinical pharmacist in morning. Pharmacy will continue to follow daily while on vancomycin and adjust as needed.     Jason Jack, [facility]    "

## 2017-01-22 NOTE — CONSULTS
[facility]  Cardiology Consult Note    Patient Identification:  Name: Lorena Yousif  Age: 76 y.o.  Sex: female  :  1940  MRN: [id number]             Requesting Physician: Dr. Steven    Reason for Consultation / Chief Complaint:  Afib RVR with Wide complex rhythm.       History of Present Illness:   The 6-year-old female who presented to [facility] with severe shortness of breath with oxygen saturations running in the 70s per family report.  She was admitted to Dr. Steven's service.  We have been asked to consult regarding A. fib with RVR.  Patient has been followed by Dr. Macias for many years for permanent A. fib, right sided heart failure, pulmonary hypertension, mechanical mitral valve, hypertension, and dyslipidemia.  She has been refusing her medications as well as labs and does not wish to pursue any further testing or treatments for her condition.  Patient verbalized to me that she wants to have hospice as she is tired and worn out.  She communicated that her goal is to be comfortable and to allow natural death.  Palliative Consult has been placed by Dr. Steven and family is at beside.  Denies chest pain but admits to significant SHOB and fatigue.      comorbid cardiac risk factors: CVA, HLD    Past Medical History:  Past Medical History   Diagnosis Date   • Acute bronchitis    • Anemia, unspecified 2016   • Arthritis    • Atrial fibrillation    • Bruising      UPPER EXTREMITIES   • COPD (chronic obstructive pulmonary disease)    • Coronary artery disease    • Deep vein thrombosis    • Depression    • Fracture cervical vertebra-closed    • History of transfusion    • Hyperlipidemia    • Impacted cerumen    • Lumbar pain    • On home oxygen therapy      2 LITERS   • Osteopenia    • Osteoporosis    • Pacemaker    • Pneumonia    • Stroke    • UTI (urinary tract infection)      RECENTLY WAS ON ANTIBIOTICS     Past Surgical History:  Past Surgical History    Procedure Laterality Date   • Coronary artery bypass graft     • Hysterectomy     • Pacemaker implantation  1999   • Cardiac valve replacement     • Oophorectomy     • Kyphoplasty N/A 6/24/2016     Procedure: KYPHOPLASTY, T6, T7;  Surgeon: Parish Obando MD;  Location: [facility] 18/19;  Service:       Allergies:  No Known Allergies     Home Meds:  Prescriptions Prior to Admission   Medication Sig Dispense Refill Last Dose   • albuterol (PROVENTIL HFA;VENTOLIN HFA) 108 (90 BASE) MCG/ACT inhaler Inhale 2 puffs every 4 (four) hours as needed for wheezing.   Taking   • atenolol (TENORMIN) 25 MG tablet Take 1 tablet by mouth daily.   Taking   • citalopram (CeleXA) 20 MG tablet TAKE ONE TABLET BY MOUTH DAILY 30 tablet 3 Taking   • digoxin (LANOXIN) 125 MCG tablet Take 125 mcg by mouth every evening.   Taking   • Fluticasone Furoate-Vilanterol (BREO ELLIPTA) 100-25 MCG/INH aerosol powder  Inhale 1 puff Daily.   Taking   • ipratropium-albuterol (DUO-NEB) 0.5-2.5 mg/mL nebulizer Take 3 mL by nebulization every 4 (four) hours as needed for wheezing or shortness of air. 360 mL 11 Taking   • Iron tablet Take 65 mg by mouth.   Taking   • lidocaine (LIDODERM) 5 % Place 1 patch on the skin daily. Remove & Discard patch within 12 hours or as directed by MD 3 patch 0 Taking   • nitroglycerin (NITROSTAT) 0.4 MG SL tablet Place 1 tablet under the tongue every 5 (five) minutes as needed for chest pain. Take no more than 3 doses in 15 minutes. 25 tablet 0 Taking   • oxyCODONE-acetaminophen (PERCOCET)  MG per tablet Take 1 tablet by mouth Every Night.      • oxyCODONE-acetaminophen (PERCOCET) 5-325 MG per tablet Take 1 tablet by mouth Every 4 (Four) Hours As Needed.      • pravastatin (PRAVACHOL) 10 MG tablet TAKE ONE TABLET BY MOUTH DAILY 30 tablet 3 Taking   • predniSONE (DELTASONE) 10 MG tablet TAKE ONE TABLET BY MOUTH DAILY 30 tablet 2    • Umeclidinium Bromide (INCRUSE ELLIPTA) 62.5 MCG/INH aerosol powder  Inhale 1  puff Daily.      • warfarin (COUMADIN) 2 MG tablet Take 2 mg by mouth 2 (Two) Times a Week. Tuesday, Thursday, Saturday   Taking   • warfarin (COUMADIN) 4 MG tablet Take 4 mg by mouth Daily. Monday, Wednesday, Friday, Sunday   Taking   • azithromycin (ZITHROMAX Z-MAIRA) 250 MG tablet Take 2 tablets the first day, then 1 tablet daily for 4 days. 6 tablet 0    • oxyCODONE-acetaminophen (ROXICET,PERCOCET) 5-325 MG/5ML solution Take 7.5 mL by mouth every 4 (four) hours as needed.   Taking   • Umeclidinium Bromide 62.5 MCG/INH aerosol powder  Inhale.   Taking     Current Meds:     Current Facility-Administered Medications:   •  diltiaZEM (CARDIZEM) 100 mg/100 mL 0.9% NS, 5-15 mg/hr, Intravenous, Continuous, Vazquez Nice MD, Last Rate: 7.5 mL/hr at 01/22/17 0800, 7.5 mg/hr at 01/22/17 0800  •  [COMPLETED] diltiaZEM (CARDIZEM) injection 5 mg, 5 mg, Intravenous, Once, 5 mg at 01/21/17 2008 **FOLLOWED BY** diltiaZEM (CARDIZEM) 100 mg/100 mL 0.9% NS, 5-15 mg/hr, Intravenous, Titrated **FOLLOWED BY** [START ON 1/27/2017] diltiazem (CARDIZEM) bolus from bag 1 mg/mL 5 mg, 5 mg, Intravenous, Q30 Min PRN, Antonio Steven MD  •  enoxaparin (LOVENOX) syringe 60 mg, 1 mg/kg, Subcutaneous, Q24H, Antonio Steven MD, 60 mg at 01/21/17 2140  •  HYDROmorphone (DILAUDID) injection 0.5 mg, 0.5 mg, Intravenous, Q2H PRN, 0.5 mg at 01/22/17 1014 **AND** naloxone (NARCAN) injection 0.4 mg, 0.4 mg, Intravenous, Q5 Min PRN, Antonio Steven MD  •  ipratropium-albuterol (DUO-NEB) nebulizer solution 3 mL, 3 mL, Nebulization, 4x Daily - RT, Antonio Steven MD, 3 mL at 01/22/17 0739  •  ipratropium-albuterol (DUO-NEB) nebulizer solution 3 mL, 3 mL, Nebulization, Q4H PRN, Antonio Steven MD  •  lactated ringers infusion, 75 mL/hr, Intravenous, Continuous, Antonio Steven MD, Last Rate: 75 mL/hr at 01/21/17 2104, 75 mL/hr at 01/21/17 2104  •  methylPREDNISolone sodium succinate (SOLU-Medrol) injection 40 mg, 40 mg, Intravenous,  Q12H, Antonio Steven MD, 40 mg at 01/22/17 0925  •  morphine injection 1 mg, 1 mg, Intravenous, Q4H PRN, 1 mg at 01/22/17 0935 **AND** naloxone (NARCAN) injection 0.4 mg, 0.4 mg, Intravenous, Q5 Min PRN, Antonio Steven MD  •  nitroglycerin (NITROSTAT) SL tablet 0.4 mg, 0.4 mg, Sublingual, Q5 Min PRN, Antonio Steven MD  •  Pharmacy to dose vancomycin, , Does not apply, Continuous PRN, Antonio Steven MD  •  piperacillin-tazobactam (ZOSYN) 3.375 g in dextrose 50 mL IVPB (premix), 3.375 g, Intravenous, Q8H, Antonio Steven MD, 3.375 g at 01/22/17 0443  •  sodium chloride 0.9 % flush 1-10 mL, 1-10 mL, Intravenous, PRN, Antonio Steven MD  •  sodium chloride 0.9 % flush 10 mL, 10 mL, Intravenous, PRN, Vazquez Nice MD  •  vancomycin (VANCOCIN) IVPB 1 g (premix) in Dextrose 5% 200 mL, 1,000 mg, Intravenous, Q24H, Antonio Steven MD     Social History:   Social History   Substance Use Topics   • Smoking status: Former Smoker     Years: 50.00     Types: Cigarettes   • Smokeless tobacco: Never Used      Comment: QUIT 13 YR AGO   • Alcohol use No      Family History:  Family History   Problem Relation Age of Onset   • Cancer Mother    • Heart disease Sister         Review of Systems pt is too short of breath to answer ROS.      Constitutional:  Temp:  [97.5 °F (36.4 °C)-100.8 °F (38.2 °C)] 97.8 °F (36.6 °C)  Heart Rate:  [] 87  Resp:  [26-37] 26  BP: ()/(43-94) 146/53    Physical Exam by Alcides Macias MD  General:  Cachexic and short of breath with any conversation.    Eyes: PERTL,  HEENT:  No JVD. Thyroid not visibly enlarged. No mucosal pallor or cyanosis  Respiratory: Respirations 25 and labored at rest. Equal chest expansion. Diminished Breath sounds throughout.  fine crackles, no rubs or wheezes auscultated  Cardiovascular: S1S2 Regular rate 80's and and Vpacing on monitor. + click. No rub or gallop auscultated. No carotid bruits. DP/PT pulses  1+  . No pretibial  pitting edema  Gastrointestinal: Abdomen soft, flat, non tender. Bowel sounds present. No hepatosplenomegaly. No ascites  Musculoskeletal: GOODMAN x4 with generalized weakness. No abnormal movements  Extremities: No digital clubbing or cyanosis  Skin: Color pale. Skin warm and dry to touch. No rashes  No xanthoma  Neuro: AAO x3 no focal deficits      Physical Exam by Dr Macias:          Cardiographics    Telemetry: V pacing with underlying AFib.  Wide complex rhythm appears to be Afib with aberrancy  or afib with intrinsic BBB rate is is slow at 71      ECG:           Echocardiogram:     12/27/13 from [facility]      Imaging    Chest X-ray:     Study Result      CT CHEST WO CONTRAST-      INDICATIONS: Short of breath, pneumonia      TECHNIQUE: UN ENHANCED CHEST CT      COMPARISON: 12/26/2015. 01/21/2017 chest x-ray      FINDINGS:              The heart size is enlarged without pericardial effusion. A few  subcentimeter short axis mediastinal lymph nodes appear similar to prior  exam.      The esophagus is markedly patulous (measured at 7.5 cm on image 70 of  series 1, previously measured at 9.1 cm) and distended with debris.      The airways appear clear.      Minimal left pleural effusion is seen.                 Study Result      XR CHEST 1 VW-      HISTORY: Female who is 76 years-old, short of breath      TECHNIQUE: Frontal view of the chest      COMPARISON: 09/10/2016      FINDINGS: Heart is enlarged. Sternotomy wires are present. Pulmonary  vasculature is mildly congested. Left-sided pacemaker and cardiac leads  are seen. No pleural effusion, pneumothorax, or focal pulmonary  consolidation. Otherwise stable.      IMPRESSION:  No focal pulmonary consolidation. Cardiomegaly and pulmonary  vascular congestion.       This report was finalized on 1/21/2017 2:04 PM by Dr. Javed De Jesus MD.         [facility] 1/8/16  CORONARY ANGIOGRAM:   1. The left main was normal and bifurcated into the left anterior descending  and  circumflex in a normal fashion.   2. Left anterior descending shows early atherosclerotic plaque only including  the diagonal and  branches.   3. Circumflex artery was dominant and free of any significant atherosclerotic  narrowing.   4. Right coronary artery was nondominant and free of any atherosclerotic  narrowing.      CONCLUSION: Only coronaries were done with no significant stenosis noted. The  patient has a history of mechanical prosthetic mitral valve as the reason LV  gram was not performed.     Lab Review     Results from last 7 days  Lab Units 01/21/17  1308   TROPONIN T ng/mL 0.091*           Results from last 7 days  Lab Units 01/21/17  1308   SODIUM mmol/L 143   POTASSIUM mmol/L 4.1   BUN mg/dL 17   CREATININE mg/dL 0.87   CALCIUM mg/dL 9.4         Results from last 7 days  Lab Units 01/21/17  1308   WBC 10*3/mm3 15.80*   HEMOGLOBIN g/dL 11.5*   HEMATOCRIT % 40.5   PLATELETS 10*3/mm3 208       Results from last 7 days  Lab Units 01/21/17  1308   INR  1.78*     ProBNP 1568 1/21/17    Assessment:  Afib RVR now V pacing on Cardizem at 7.5 mg/hr.  Mechanical Mitral Valve   EF 50-55% 12/27/13  Pulmonary HTN with RVSP 40-55 mg 12/27/13  Elevated Trop 0.091    Recommendations / Plan:   Continue cardizem for now for comfort until decision is reached regarding palliative care.     INR is subtherapeutic at 1.78 on 1/21/17.  She refused INR lab this am and has not taken coumadin since admission.  She refuses Lovenox SQ.    She declines any further testing and treatment and wants to go palliative however family has been hesitant.  They are waiting on another son to arrive at bedside per RN report.        I reviewed the patient's clinical results, medications and treatment plan. I personally viewed and interpreted the patient's EKG/Telemetry data    Alcides Macias MD  1/22/2017, 11:08 AM      EMR Dragon/Transcription disclaimer:   Much of this encounter note is an electronic  transcription/translation of spoken language to printed text. The electronic translation of spoken language may permit erroneous, or at times, nonsensical words or phrases to be inadvertently transcribed; Although I have reviewed the note for such errors, some may still exist.

## 2017-01-23 NOTE — PROGRESS NOTES
Pulmonary progress note    I received several calls this morning from nursing patient was refusing the noninvasive ventilator she was refusing lab draws she was requesting palliative care.  I discussed with Dr. Jacinto this morning she had a 22 beat run of V. tach last night.  She called her family and and told them she wanted to pursue a palliative course the palliative care team with her and it was decided to stop all therapies except for those providing comfort aware that she would not likely survive long.  The patient is now lying in bed she does not appear in any acute distress she is very minimally responsive family is at bedside neither the family or patient to express any needs at this time we will continue palliative care

## 2017-01-23 NOTE — PROGRESS NOTES
Discharge Planning Assessment  [facility]     Patient Name: Lorena Yousif  MRN: [id number]  Today's Date: 1/23/2017    Admit Date: 1/21/2017          Discharge Needs Assessment     None            Discharge Plan       01/23/17 1600    Case Management/Social Work Plan    Additional Comments The patient transferred to [facility] from [address] [place] [address] on 1/22/17 @14:10. The patient is palliative and no [facility] order at this time. ANDREA Coon RN, CCP.         Discharge Placement     No information found                Demographic Summary     None            Functional Status     None            Psychosocial     None            Abuse/Neglect     None            Legal     None            Substance Abuse     None            Patient Forms     None          Araceli Coon, RN

## 2017-01-23 NOTE — PLAN OF CARE
Problem: Patient Care Overview (Adult)  Goal: Plan of Care Review  Outcome: Ongoing (interventions implemented as appropriate)    01/23/17 0513   Coping/Psychosocial Response Interventions   Plan Of Care Reviewed With patient;family   Patient Care Overview   Progress declining   Outcome Evaluation   Outcome Summary/Follow up Plan Pt less responsive this shift. Woke up one time and was able to get pt on bsc with assist x2. Very soa after that and dilaudid and ativan were needed. But still seems sob. Will continue to monitor. Family at bedside.        Goal: Adult Individualization and Mutuality  Outcome: Ongoing (interventions implemented as appropriate)  Goal: Discharge Needs Assessment  Outcome: Ongoing (interventions implemented as appropriate)    Problem: Pain, Acute (Adult)  Goal: Acceptable Pain Control/Comfort Level  Outcome: Ongoing (interventions implemented as appropriate)    Problem: Fall Risk (Adult)  Goal: Absence of Falls  Outcome: Ongoing (interventions implemented as appropriate)    Problem: Pressure Ulcer Risk (Mitul Scale) (Adult,Obstetrics,Pediatric)  Goal: Skin Integrity  Outcome: Ongoing (interventions implemented as appropriate)    Problem: Dying Patient, Actively (Adult)  Goal: Identify Related Risk Factors and Signs and Symptoms  Outcome: Ongoing (interventions implemented as appropriate)  Goal: Comfort/Pain Control  Outcome: Ongoing (interventions implemented as appropriate)  Goal: Dying Process, Peace and Dignity  Outcome: Ongoing (interventions implemented as appropriate)

## 2017-01-24 NOTE — PLAN OF CARE
Problem: Patient Care Overview (Adult)  Goal: Plan of Care Review  Outcome: Ongoing (interventions implemented as appropriate)    01/24/17 0500   Coping/Psychosocial Response Interventions   Plan Of Care Reviewed With family   Patient Care Overview   Progress declining   Outcome Evaluation   Outcome Summary/Follow up Plan Pt premedicated for turns with dilaudid, ativan, and robinul. Tolerated. Family remains at bedside. Changed mepilex to skin tear on left hand several times.       Goal: Adult Individualization and Mutuality  Outcome: Ongoing (interventions implemented as appropriate)  Goal: Discharge Needs Assessment  Outcome: Ongoing (interventions implemented as appropriate)    Problem: Pain, Acute (Adult)  Goal: Acceptable Pain Control/Comfort Level  Outcome: Ongoing (interventions implemented as appropriate)    Problem: Fall Risk (Adult)  Goal: Absence of Falls  Outcome: Ongoing (interventions implemented as appropriate)    Problem: Pressure Ulcer Risk (Mitul Scale) (Adult,Obstetrics,Pediatric)  Goal: Skin Integrity  Outcome: Ongoing (interventions implemented as appropriate)    Problem: Dying Patient, Actively (Adult)  Goal: Identify Related Risk Factors and Signs and Symptoms  Outcome: Ongoing (interventions implemented as appropriate)  Goal: Comfort/Pain Control  Outcome: Ongoing (interventions implemented as appropriate)  Goal: Dying Process, Peace and Dignity  Outcome: Ongoing (interventions implemented as appropriate)

## 2017-01-24 NOTE — PLAN OF CARE
Problem: Patient Care Overview (Adult)  Goal: Plan of Care Review  Outcome: Ongoing (interventions implemented as appropriate)    01/23/17 0513 01/23/17 0837 01/23/17 2013   Coping/Psychosocial Response Interventions   Plan Of Care Reviewed With --  patient;family --    Patient Care Overview   Progress declining --  --    Outcome Evaluation   Outcome Summary/Follow up Plan --  --  Pt restless and anxious most of this shift. Medicated several times with ativan and dilaudid. F/c placed. Pt tugged at f/c, was medicated sublinugally when IV leaked. Will continue to monitor and provide comfort care.       Goal: Adult Individualization and Mutuality  Outcome: Ongoing (interventions implemented as appropriate)  Goal: Discharge Needs Assessment  Outcome: Ongoing (interventions implemented as appropriate)    Problem: Pain, Acute (Adult)  Goal: Acceptable Pain Control/Comfort Level  Outcome: Ongoing (interventions implemented as appropriate)    Problem: Fall Risk (Adult)  Goal: Absence of Falls  Outcome: Ongoing (interventions implemented as appropriate)    Problem: Pressure Ulcer Risk (Mitul Scale) (Adult,Obstetrics,Pediatric)  Goal: Skin Integrity  Outcome: Ongoing (interventions implemented as appropriate)    Problem: Dying Patient, Actively (Adult)  Goal: Identify Related Risk Factors and Signs and Symptoms  Outcome: Ongoing (interventions implemented as appropriate)  Goal: Comfort/Pain Control  Outcome: Ongoing (interventions implemented as appropriate)  Goal: Dying Process, Peace and Dignity  Outcome: Ongoing (interventions implemented as appropriate)

## 2017-01-24 NOTE — PLAN OF CARE
Problem: Patient Care Overview (Adult)  Goal: Plan of Care Review  Outcome: Ongoing (interventions implemented as appropriate)    01/24/17 0500 01/24/17 0903 01/24/17 1534   Coping/Psychosocial Response Interventions   Plan Of Care Reviewed With --  patient;daughter --    Patient Care Overview   Progress declining --  --    Outcome Evaluation   Outcome Summary/Follow up Plan --  --  Pt responds only to pain, otherwise unresponsive. Pt has appeared comfortable today. Premedicated prior to q4h turns. [facility] consulted. Will continue to provide comfort care.       Goal: Adult Individualization and Mutuality  Outcome: Ongoing (interventions implemented as appropriate)  Goal: Discharge Needs Assessment  Outcome: Ongoing (interventions implemented as appropriate)    Problem: Pain, Acute (Adult)  Goal: Acceptable Pain Control/Comfort Level  Outcome: Ongoing (interventions implemented as appropriate)    Problem: Fall Risk (Adult)  Goal: Absence of Falls  Outcome: Ongoing (interventions implemented as appropriate)    Problem: Pressure Ulcer Risk (Mitul Scale) (Adult,Obstetrics,Pediatric)  Goal: Skin Integrity  Outcome: Ongoing (interventions implemented as appropriate)    Problem: Dying Patient, Actively (Adult)  Goal: Identify Related Risk Factors and Signs and Symptoms  Outcome: Ongoing (interventions implemented as appropriate)  Goal: Comfort/Pain Control  Outcome: Ongoing (interventions implemented as appropriate)  Goal: Dying Process, Peace and Dignity  Outcome: Ongoing (interventions implemented as appropriate)

## 2017-01-24 NOTE — PROGRESS NOTES
Pulmonary follow-up note    Patient is currently obtunded and family is at bedside apparently she has awoken a couple of times today and begin real distress and it had trouble getting her, again she is calm currently we talked and talked with the nurses will just try and keep her calm.  She has terminal congestion her respirations are labored about 20 and she's not moving much air and on much longer she can survive this way but will try every way possible to keep her comfortable and not let her get in distress like was described earlier today

## 2017-01-25 NOTE — PROGRESS NOTES
Pulmonary follow-up    Patient is obtunded with multiple family members at bedside they note she had really no more the agitation episodes like yesterday she's been comfortable all day and she really hasn't responded much to them.  On exam she is obtunded respirations are shallow but rapid he has terminal congestion.  The family had some questions about hospice we've consult hospice to meet and discuss with them whether there services can be of any benefit.  He expressed no needs or concerns at this time will continue current palliative care end stage COPD with acute on chronic hypoxic respiratory failure secondary to COPD and acute pneumonia

## 2017-01-25 NOTE — PROGRESS NOTES
Discharge Planning Assessment  [facility]     Patient Name: Lorena Yousif  MRN: [id number]  Today's Date: 2017    Admit Date: 2017          Discharge Needs Assessment     None            Discharge Plan       17 0959    Final Note    Final Note The patient  on 17 @ 01:40. ANDREA Coon Rn, CCP        Discharge Placement     No information found        Expected Discharge Date and Time     Expected Discharge Date Expected Discharge Time    2017  1:45 AM              Demographic Summary     None            Functional Status     None            Psychosocial     None            Abuse/Neglect     None            Legal     None            Substance Abuse     None            Patient Forms     None          Araceli Coon, RN

## 2017-01-26 LAB
BACTERIA SPEC AEROBE CULT: NORMAL
BACTERIA SPEC AEROBE CULT: NORMAL

## 2017-01-26 NOTE — DISCHARGE SUMMARY
Discharge summary    Diagnoses:  #1 bilateral community-acquired probable aspiration pneumonia empirically for gram-negative enteric organisms and MRSA.  #2 acute on chronic hypoxemic and hypercapnic respiratory failure  #3 acute exacerbation of chronic obstructive pulmonary disease  #4 severe sepsis  #5 lactic acidosis  #6 atrial fibrillation with rapid ventricular response  #7 distended patulous esophagus and hiatal hernia  #8 elevated troponin demand ischemia    Hospital course:  Patient was admitted with pneumonia respiratory failure she has very severe COPD and chronic hypoxemic and hypercapnic respiratory failure she has this dilated patulous esophagus and hiatal hernia she is very prone to reflux the distribution of her infiltrates are suggestive of possible aspiration event patient was treated for enteric gram-negative organisms and MRSA.  Patient was a DO NOT INTUBATE DO NOT RESUSCITATE but her course was pretty severe she did not like wearing noninvasive ventilator and she got tired of all the sticks she told the nurses and subsequently told her family that she was done and she was ready for palliative care.  Her condition was such that this seemed quite appropriate known her for a while and see her decline.  She was transferred to the palliative care unit and she  there on 17